# Patient Record
Sex: MALE | Race: BLACK OR AFRICAN AMERICAN | NOT HISPANIC OR LATINO | Employment: OTHER | RURAL
[De-identification: names, ages, dates, MRNs, and addresses within clinical notes are randomized per-mention and may not be internally consistent; named-entity substitution may affect disease eponyms.]

---

## 2017-11-01 ENCOUNTER — HISTORICAL (OUTPATIENT)
Dept: ADMINISTRATIVE | Facility: HOSPITAL | Age: 47
End: 2017-11-01

## 2017-11-02 LAB
LAB AP CLINICAL INFORMATION: NORMAL
LAB AP DIAGNOSIS - HISTORICAL: NORMAL
LAB AP GROSS PATHOLOGY - HISTORICAL: NORMAL
LAB AP SPECIMEN SUBMITTED - HISTORICAL: NORMAL

## 2018-07-19 ENCOUNTER — HISTORICAL (OUTPATIENT)
Dept: ADMINISTRATIVE | Facility: HOSPITAL | Age: 48
End: 2018-07-19

## 2018-07-20 LAB
LAB AP DIAGNOSIS - HISTORICAL: NORMAL
LAB AP GROSS PATHOLOGY - HISTORICAL: NORMAL
LAB AP SPECIMEN SUBMITTED - HISTORICAL: NORMAL

## 2021-06-24 ENCOUNTER — OFFICE VISIT (OUTPATIENT)
Dept: FAMILY MEDICINE | Facility: CLINIC | Age: 51
End: 2021-06-24
Payer: MEDICAID

## 2021-06-24 VITALS
SYSTOLIC BLOOD PRESSURE: 147 MMHG | HEIGHT: 69 IN | TEMPERATURE: 97 F | OXYGEN SATURATION: 98 % | HEART RATE: 80 BPM | RESPIRATION RATE: 20 BRPM | DIASTOLIC BLOOD PRESSURE: 100 MMHG | WEIGHT: 215 LBS | BODY MASS INDEX: 31.84 KG/M2

## 2021-06-24 DIAGNOSIS — H66.91 RIGHT OTITIS MEDIA, UNSPECIFIED OTITIS MEDIA TYPE: ICD-10-CM

## 2021-06-24 DIAGNOSIS — J01.00 ACUTE NON-RECURRENT MAXILLARY SINUSITIS: Primary | ICD-10-CM

## 2021-06-24 PROCEDURE — 96372 THER/PROPH/DIAG INJ SC/IM: CPT | Mod: ,,, | Performed by: NURSE PRACTITIONER

## 2021-06-24 PROCEDURE — 99213 PR OFFICE/OUTPT VISIT, EST, LEVL III, 20-29 MIN: ICD-10-PCS | Mod: 25,,, | Performed by: NURSE PRACTITIONER

## 2021-06-24 PROCEDURE — 96372 PR INJECTION,THERAP/PROPH/DIAG2ST, IM OR SUBCUT: ICD-10-PCS | Mod: ,,, | Performed by: NURSE PRACTITIONER

## 2021-06-24 PROCEDURE — 99213 OFFICE O/P EST LOW 20 MIN: CPT | Mod: 25,,, | Performed by: NURSE PRACTITIONER

## 2021-06-24 RX ORDER — FLUTICASONE PROPIONATE 50 MCG
SPRAY, SUSPENSION (ML) NASAL
COMMUNITY
Start: 2021-05-05

## 2021-06-24 RX ORDER — CEFDINIR 300 MG/1
300 CAPSULE ORAL 2 TIMES DAILY
Qty: 20 CAPSULE | Refills: 0 | Status: SHIPPED | OUTPATIENT
Start: 2021-06-24 | End: 2021-07-04

## 2021-06-24 RX ORDER — CEFTRIAXONE 1 G/1
1 INJECTION, POWDER, FOR SOLUTION INTRAMUSCULAR; INTRAVENOUS
Status: COMPLETED | OUTPATIENT
Start: 2021-06-24 | End: 2021-06-24

## 2021-06-24 RX ORDER — MORPHINE SULFATE 15 MG/1
TABLET, FILM COATED, EXTENDED RELEASE ORAL
COMMUNITY
Start: 2021-06-21

## 2021-06-24 RX ORDER — PANTOPRAZOLE SODIUM 20 MG/1
20 TABLET, DELAYED RELEASE ORAL DAILY
COMMUNITY
Start: 2021-04-28 | End: 2023-01-13

## 2021-06-24 RX ORDER — DEXAMETHASONE SODIUM PHOSPHATE 4 MG/ML
6 INJECTION, SOLUTION INTRA-ARTICULAR; INTRALESIONAL; INTRAMUSCULAR; INTRAVENOUS; SOFT TISSUE ONCE
Status: COMPLETED | OUTPATIENT
Start: 2021-06-24 | End: 2021-06-24

## 2021-06-24 RX ORDER — LOVASTATIN 20 MG/1
TABLET ORAL
COMMUNITY
Start: 2021-05-19

## 2021-06-24 RX ORDER — PALIPERIDONE 9 MG/1
9 TABLET, EXTENDED RELEASE ORAL DAILY
COMMUNITY
Start: 2021-04-01

## 2021-06-24 RX ORDER — LISINOPRIL AND HYDROCHLOROTHIAZIDE 20; 25 MG/1; MG/1
1 TABLET ORAL DAILY
COMMUNITY
Start: 2021-06-11

## 2021-06-24 RX ORDER — CETIRIZINE HYDROCHLORIDE 10 MG/1
TABLET ORAL
COMMUNITY
Start: 2021-04-28

## 2021-06-24 RX ORDER — DULOXETIN HYDROCHLORIDE 60 MG/1
CAPSULE, DELAYED RELEASE ORAL
COMMUNITY
Start: 2021-04-01

## 2021-06-24 RX ORDER — CETIRIZINE HYDROCHLORIDE, PSEUDOEPHEDRINE HYDROCHLORIDE 5; 120 MG/1; MG/1
1 TABLET, FILM COATED, EXTENDED RELEASE ORAL 2 TIMES DAILY
Qty: 20 TABLET | Refills: 0 | Status: SHIPPED | OUTPATIENT
Start: 2021-06-24 | End: 2021-07-04

## 2021-06-24 RX ADMIN — CEFTRIAXONE 1 G: 1 INJECTION, POWDER, FOR SOLUTION INTRAMUSCULAR; INTRAVENOUS at 10:06

## 2021-06-24 RX ADMIN — DEXAMETHASONE SODIUM PHOSPHATE 6 MG: 4 INJECTION, SOLUTION INTRA-ARTICULAR; INTRALESIONAL; INTRAMUSCULAR; INTRAVENOUS; SOFT TISSUE at 10:06

## 2021-09-27 PROBLEM — J01.00 ACUTE NON-RECURRENT MAXILLARY SINUSITIS: Status: RESOLVED | Noted: 2021-06-24 | Resolved: 2021-09-27

## 2021-12-23 ENCOUNTER — OFFICE VISIT (OUTPATIENT)
Dept: FAMILY MEDICINE | Facility: CLINIC | Age: 51
End: 2021-12-23
Payer: MEDICAID

## 2021-12-23 VITALS
SYSTOLIC BLOOD PRESSURE: 136 MMHG | WEIGHT: 216 LBS | RESPIRATION RATE: 18 BRPM | BODY MASS INDEX: 31.99 KG/M2 | HEART RATE: 94 BPM | OXYGEN SATURATION: 99 % | TEMPERATURE: 98 F | HEIGHT: 69 IN | DIASTOLIC BLOOD PRESSURE: 72 MMHG

## 2021-12-23 DIAGNOSIS — L42 PITYRIASIS ROSEA: Primary | ICD-10-CM

## 2021-12-23 PROCEDURE — 99213 OFFICE O/P EST LOW 20 MIN: CPT | Mod: 25,,, | Performed by: NURSE PRACTITIONER

## 2021-12-23 PROCEDURE — 3008F PR BODY MASS INDEX (BMI) DOCUMENTED: ICD-10-PCS | Mod: CPTII,,, | Performed by: NURSE PRACTITIONER

## 2021-12-23 PROCEDURE — 3075F SYST BP GE 130 - 139MM HG: CPT | Mod: CPTII,,, | Performed by: NURSE PRACTITIONER

## 2021-12-23 PROCEDURE — 3075F PR MOST RECENT SYSTOLIC BLOOD PRESS GE 130-139MM HG: ICD-10-PCS | Mod: CPTII,,, | Performed by: NURSE PRACTITIONER

## 2021-12-23 PROCEDURE — 99213 PR OFFICE/OUTPT VISIT, EST, LEVL III, 20-29 MIN: ICD-10-PCS | Mod: 25,,, | Performed by: NURSE PRACTITIONER

## 2021-12-23 PROCEDURE — 3078F DIAST BP <80 MM HG: CPT | Mod: CPTII,,, | Performed by: NURSE PRACTITIONER

## 2021-12-23 PROCEDURE — 1160F RVW MEDS BY RX/DR IN RCRD: CPT | Mod: CPTII,,, | Performed by: NURSE PRACTITIONER

## 2021-12-23 PROCEDURE — 4010F PR ACE/ARB THEARPY RXD/TAKEN: ICD-10-PCS | Mod: CPTII,,, | Performed by: NURSE PRACTITIONER

## 2021-12-23 PROCEDURE — 1159F MED LIST DOCD IN RCRD: CPT | Mod: CPTII,,, | Performed by: NURSE PRACTITIONER

## 2021-12-23 PROCEDURE — 4010F ACE/ARB THERAPY RXD/TAKEN: CPT | Mod: CPTII,,, | Performed by: NURSE PRACTITIONER

## 2021-12-23 PROCEDURE — 3008F BODY MASS INDEX DOCD: CPT | Mod: CPTII,,, | Performed by: NURSE PRACTITIONER

## 2021-12-23 PROCEDURE — 96372 THER/PROPH/DIAG INJ SC/IM: CPT | Mod: ,,, | Performed by: NURSE PRACTITIONER

## 2021-12-23 PROCEDURE — 1160F PR REVIEW ALL MEDS BY PRESCRIBER/CLIN PHARMACIST DOCUMENTED: ICD-10-PCS | Mod: CPTII,,, | Performed by: NURSE PRACTITIONER

## 2021-12-23 PROCEDURE — 96372 PR INJECTION,THERAP/PROPH/DIAG2ST, IM OR SUBCUT: ICD-10-PCS | Mod: ,,, | Performed by: NURSE PRACTITIONER

## 2021-12-23 PROCEDURE — 3078F PR MOST RECENT DIASTOLIC BLOOD PRESSURE < 80 MM HG: ICD-10-PCS | Mod: CPTII,,, | Performed by: NURSE PRACTITIONER

## 2021-12-23 PROCEDURE — 1159F PR MEDICATION LIST DOCUMENTED IN MEDICAL RECORD: ICD-10-PCS | Mod: CPTII,,, | Performed by: NURSE PRACTITIONER

## 2021-12-23 RX ORDER — PREDNISONE 20 MG/1
TABLET ORAL
Qty: 9 TABLET | Refills: 0 | Status: SHIPPED | OUTPATIENT
Start: 2021-12-23 | End: 2021-12-29

## 2021-12-23 RX ORDER — DEXAMETHASONE SODIUM PHOSPHATE 4 MG/ML
4 INJECTION, SOLUTION INTRA-ARTICULAR; INTRALESIONAL; INTRAMUSCULAR; INTRAVENOUS; SOFT TISSUE
Status: DISCONTINUED | OUTPATIENT
Start: 2021-12-23 | End: 2021-12-23

## 2021-12-23 RX ORDER — DEXAMETHASONE SODIUM PHOSPHATE 4 MG/ML
6 INJECTION, SOLUTION INTRA-ARTICULAR; INTRALESIONAL; INTRAMUSCULAR; INTRAVENOUS; SOFT TISSUE
Status: COMPLETED | OUTPATIENT
Start: 2021-12-23 | End: 2021-12-23

## 2021-12-23 RX ORDER — TRIAMCINOLONE ACETONIDE 1 MG/G
CREAM TOPICAL 2 TIMES DAILY
Qty: 453.6 G | Refills: 0 | Status: SHIPPED | OUTPATIENT
Start: 2021-12-23

## 2021-12-23 RX ADMIN — DEXAMETHASONE SODIUM PHOSPHATE 6 MG: 4 INJECTION, SOLUTION INTRA-ARTICULAR; INTRALESIONAL; INTRAMUSCULAR; INTRAVENOUS; SOFT TISSUE at 10:12

## 2022-01-05 ENCOUNTER — OFFICE VISIT (OUTPATIENT)
Dept: FAMILY MEDICINE | Facility: CLINIC | Age: 52
End: 2022-01-05
Payer: MEDICAID

## 2022-01-05 VITALS
OXYGEN SATURATION: 98 % | TEMPERATURE: 97 F | HEART RATE: 94 BPM | DIASTOLIC BLOOD PRESSURE: 82 MMHG | HEIGHT: 69 IN | WEIGHT: 218 LBS | RESPIRATION RATE: 17 BRPM | SYSTOLIC BLOOD PRESSURE: 125 MMHG | BODY MASS INDEX: 32.29 KG/M2

## 2022-01-05 DIAGNOSIS — U07.1 COVID-19: Primary | ICD-10-CM

## 2022-01-05 DIAGNOSIS — Z11.52 ENCOUNTER FOR SCREENING LABORATORY TESTING FOR COVID-19 VIRUS: ICD-10-CM

## 2022-01-05 LAB
CTP QC/QA: YES
FLUAV AG NPH QL: NEGATIVE
FLUBV AG NPH QL: NEGATIVE
SARS-COV-2 AG RESP QL IA.RAPID: POSITIVE

## 2022-01-05 PROCEDURE — 1159F MED LIST DOCD IN RCRD: CPT | Mod: CPTII,,, | Performed by: FAMILY MEDICINE

## 2022-01-05 PROCEDURE — 1159F PR MEDICATION LIST DOCUMENTED IN MEDICAL RECORD: ICD-10-PCS | Mod: CPTII,,, | Performed by: FAMILY MEDICINE

## 2022-01-05 PROCEDURE — 3008F PR BODY MASS INDEX (BMI) DOCUMENTED: ICD-10-PCS | Mod: CPTII,,, | Performed by: FAMILY MEDICINE

## 2022-01-05 PROCEDURE — 99214 PR OFFICE/OUTPT VISIT, EST, LEVL IV, 30-39 MIN: ICD-10-PCS | Mod: ,,, | Performed by: FAMILY MEDICINE

## 2022-01-05 PROCEDURE — 3074F PR MOST RECENT SYSTOLIC BLOOD PRESSURE < 130 MM HG: ICD-10-PCS | Mod: CPTII,,, | Performed by: FAMILY MEDICINE

## 2022-01-05 PROCEDURE — 3074F SYST BP LT 130 MM HG: CPT | Mod: CPTII,,, | Performed by: FAMILY MEDICINE

## 2022-01-05 PROCEDURE — 3079F DIAST BP 80-89 MM HG: CPT | Mod: CPTII,,, | Performed by: FAMILY MEDICINE

## 2022-01-05 PROCEDURE — 1160F RVW MEDS BY RX/DR IN RCRD: CPT | Mod: CPTII,,, | Performed by: FAMILY MEDICINE

## 2022-01-05 PROCEDURE — 1160F PR REVIEW ALL MEDS BY PRESCRIBER/CLIN PHARMACIST DOCUMENTED: ICD-10-PCS | Mod: CPTII,,, | Performed by: FAMILY MEDICINE

## 2022-01-05 PROCEDURE — 3079F PR MOST RECENT DIASTOLIC BLOOD PRESSURE 80-89 MM HG: ICD-10-PCS | Mod: CPTII,,, | Performed by: FAMILY MEDICINE

## 2022-01-05 PROCEDURE — 3008F BODY MASS INDEX DOCD: CPT | Mod: CPTII,,, | Performed by: FAMILY MEDICINE

## 2022-01-05 PROCEDURE — 87428 SARSCOV & INF VIR A&B AG IA: CPT | Mod: RHCUB | Performed by: FAMILY MEDICINE

## 2022-01-05 PROCEDURE — 99214 OFFICE O/P EST MOD 30 MIN: CPT | Mod: ,,, | Performed by: FAMILY MEDICINE

## 2022-01-05 RX ORDER — PREDNISONE 20 MG/1
20 TABLET ORAL DAILY
Qty: 5 TABLET | Refills: 0 | Status: SHIPPED | OUTPATIENT
Start: 2022-01-05 | End: 2022-01-10

## 2022-01-05 RX ORDER — CETIRIZINE HYDROCHLORIDE 10 MG/1
10 TABLET ORAL DAILY
Qty: 10 TABLET | Refills: 0 | Status: SHIPPED | OUTPATIENT
Start: 2022-01-05 | End: 2022-01-15

## 2022-01-05 RX ORDER — AZITHROMYCIN 250 MG/1
TABLET, FILM COATED ORAL
Qty: 6 TABLET | Refills: 0 | Status: ON HOLD | OUTPATIENT
Start: 2022-01-05 | End: 2022-03-22

## 2022-01-05 NOTE — PROGRESS NOTES
Subjective:       Patient ID: Javad Garcia is a 51 y.o. male.    Chief Complaint: COVID-19 Concerns (Sore throat/chest congestion x 3days)    HPI  Review of Systems   Constitutional: Negative for activity change, appetite change, chills, diaphoresis, fatigue, fever and unexpected weight change.   HENT: Positive for nasal congestion, postnasal drip, rhinorrhea and sinus pressure/congestion. Negative for dental problem, drooling, ear discharge, ear pain, facial swelling, hearing loss, mouth sores, nosebleeds, sneezing, sore throat, tinnitus, trouble swallowing, voice change and goiter.    Eyes: Negative for photophobia, pain, discharge, redness, itching and visual disturbance.   Respiratory: Positive for cough. Negative for apnea, choking, chest tightness, shortness of breath, wheezing and stridor.    Cardiovascular: Negative for chest pain, palpitations, leg swelling and claudication.   Gastrointestinal: Negative for abdominal distention, abdominal pain, anal bleeding, blood in stool, change in bowel habit, constipation, diarrhea, nausea, vomiting, reflux, fecal incontinence and change in bowel habit.   Endocrine: Negative for cold intolerance, heat intolerance, polydipsia, polyphagia and polyuria.   Genitourinary: Negative for bladder incontinence, decreased urine volume, difficulty urinating, discharge, dysuria, enuresis, erectile dysfunction, flank pain, frequency, genital sores, hematuria, penile pain, testicular pain and urgency.   Musculoskeletal: Negative for arthralgias, back pain, gait problem, joint swelling, leg pain, myalgias, neck pain, neck stiffness and joint deformity.   Integumentary:  Negative for pallor, rash, wound and mole/lesion.   Allergic/Immunologic: Negative for environmental allergies, food allergies and frequent infections.   Neurological: Negative for dizziness, vertigo, tremors, seizures, syncope, facial asymmetry, speech difficulty, weakness, light-headedness, numbness, headaches,  disturbances in coordination, memory loss and coordination difficulties.   Hematological: Negative for adenopathy. Does not bruise/bleed easily.   Psychiatric/Behavioral: Negative for agitation, behavioral problems, confusion, decreased concentration, dysphoric mood, hallucinations, self-injury, sleep disturbance and suicidal ideas. The patient is not nervous/anxious and is not hyperactive.          Objective:      Physical Exam  Vitals reviewed.   Constitutional:       Appearance: Normal appearance. He is normal weight.   HENT:      Head: Normocephalic and atraumatic.      Right Ear: Tympanic membrane and ear canal normal.      Left Ear: Tympanic membrane, ear canal and external ear normal.      Nose: Congestion and rhinorrhea present.      Mouth/Throat:      Mouth: Mucous membranes are moist.      Pharynx: Oropharynx is clear. Posterior oropharyngeal erythema present.   Eyes:      Extraocular Movements: Extraocular movements intact.      Conjunctiva/sclera: Conjunctivae normal.      Pupils: Pupils are equal, round, and reactive to light.   Cardiovascular:      Rate and Rhythm: Normal rate and regular rhythm.      Pulses: Normal pulses.      Heart sounds: Normal heart sounds.   Pulmonary:      Effort: Pulmonary effort is normal.      Breath sounds: Normal breath sounds.   Abdominal:      General: Abdomen is flat. Bowel sounds are normal.      Palpations: Abdomen is soft.   Musculoskeletal:         General: Normal range of motion.      Cervical back: Normal range of motion and neck supple.   Skin:     General: Skin is warm and dry.   Neurological:      General: No focal deficit present.      Mental Status: He is alert and oriented to person, place, and time. Mental status is at baseline.   Psychiatric:         Mood and Affect: Mood normal.         Behavior: Behavior normal.         Thought Content: Thought content normal.         Judgment: Judgment normal.         Assessment:       1. COVID-19    2. Encounter for  screening laboratory testing for COVID-19 virus        Plan:     COVID-19    Encounter for screening laboratory testing for COVID-19 virus  -     POCT SARS-COV2 (COVID) with Flu Antigen    Other orders  -     azithromycin (Z-DONG) 250 MG tablet; Take 2 tablets by mouth on day 1; Take 1 tablet by mouth on days 2-5  Dispense: 6 tablet; Refill: 0  -     predniSONE (DELTASONE) 20 MG tablet; Take 1 tablet (20 mg total) by mouth once daily. for 5 days  Dispense: 5 tablet; Refill: 0  -     cetirizine (ZYRTEC) 10 MG tablet; Take 1 tablet (10 mg total) by mouth once daily. for 10 days  Dispense: 10 tablet; Refill: 0         Covid positive, treat for this, quarantine for 5 days.

## 2022-03-09 ENCOUNTER — HOSPITAL ENCOUNTER (OUTPATIENT)
Dept: RADIOLOGY | Facility: HOSPITAL | Age: 52
Discharge: HOME | End: 2022-03-09
Attending: ORTHOPAEDIC SURGERY
Payer: MEDICAID

## 2022-03-09 DIAGNOSIS — M25.512 LEFT SHOULDER PAIN, UNSPECIFIED CHRONICITY: ICD-10-CM

## 2022-03-09 PROBLEM — M75.122 NONTRAUMATIC COMPLETE TEAR OF LEFT ROTATOR CUFF: Status: ACTIVE | Noted: 2022-03-09

## 2022-03-09 PROCEDURE — 73030 X-RAY EXAM OF SHOULDER: CPT | Mod: TC,LT

## 2022-03-22 ENCOUNTER — ANESTHESIA (OUTPATIENT)
Dept: SURGERY | Facility: HOSPITAL | Age: 52
End: 2022-03-22
Payer: MEDICAID

## 2022-03-22 ENCOUNTER — HOSPITAL ENCOUNTER (OUTPATIENT)
Facility: HOSPITAL | Age: 52
Discharge: HOME | End: 2022-03-22
Attending: ORTHOPAEDIC SURGERY | Admitting: ORTHOPAEDIC SURGERY
Payer: MEDICAID

## 2022-03-22 ENCOUNTER — ANESTHESIA EVENT (OUTPATIENT)
Dept: SURGERY | Facility: HOSPITAL | Age: 52
End: 2022-03-22
Payer: MEDICAID

## 2022-03-22 VITALS
RESPIRATION RATE: 16 BRPM | WEIGHT: 214 LBS | TEMPERATURE: 98 F | HEART RATE: 95 BPM | HEIGHT: 69 IN | OXYGEN SATURATION: 94 % | DIASTOLIC BLOOD PRESSURE: 66 MMHG | SYSTOLIC BLOOD PRESSURE: 125 MMHG | BODY MASS INDEX: 31.7 KG/M2

## 2022-03-22 DIAGNOSIS — I10 HTN (HYPERTENSION): ICD-10-CM

## 2022-03-22 DIAGNOSIS — M75.122 NONTRAUMATIC COMPLETE TEAR OF LEFT ROTATOR CUFF: Primary | ICD-10-CM

## 2022-03-22 PROCEDURE — 64415 PERIPHERAL BLOCK: ICD-10-PCS | Mod: XU,LT,, | Performed by: ANESTHESIOLOGY

## 2022-03-22 PROCEDURE — 27000716 HC OXISENSOR PROBE, ANY SIZE: Performed by: ANESTHESIOLOGY

## 2022-03-22 PROCEDURE — D9220A PRA ANESTHESIA: ICD-10-PCS | Mod: CRNA,,, | Performed by: NURSE ANESTHETIST, CERTIFIED REGISTERED

## 2022-03-22 PROCEDURE — 01630 ANES OPN/ARTHR PX SHO JT NOS: CPT | Performed by: ORTHOPAEDIC SURGERY

## 2022-03-22 PROCEDURE — 27000260 *HC AIRWAY ORAL: Performed by: ANESTHESIOLOGY

## 2022-03-22 PROCEDURE — 37000009 HC ANESTHESIA EA ADD 15 MINS: Performed by: ORTHOPAEDIC SURGERY

## 2022-03-22 PROCEDURE — 71000033 HC RECOVERY, INTIAL HOUR: Performed by: ORTHOPAEDIC SURGERY

## 2022-03-22 PROCEDURE — 63600175 PHARM REV CODE 636 W HCPCS: Performed by: ANESTHESIOLOGY

## 2022-03-22 PROCEDURE — 27000165 HC TUBE, ETT CUFFED: Performed by: ANESTHESIOLOGY

## 2022-03-22 PROCEDURE — D9220A PRA ANESTHESIA: Mod: ANES,,, | Performed by: ANESTHESIOLOGY

## 2022-03-22 PROCEDURE — 93010 ELECTROCARDIOGRAM REPORT: CPT | Mod: ,,, | Performed by: HOSPITALIST

## 2022-03-22 PROCEDURE — 63600175 PHARM REV CODE 636 W HCPCS: Performed by: NURSE ANESTHETIST, CERTIFIED REGISTERED

## 2022-03-22 PROCEDURE — C1713 ANCHOR/SCREW BN/BN,TIS/BN: HCPCS | Performed by: ORTHOPAEDIC SURGERY

## 2022-03-22 PROCEDURE — 25000003 PHARM REV CODE 250: Performed by: NURSE ANESTHETIST, CERTIFIED REGISTERED

## 2022-03-22 PROCEDURE — 27000510 HC BLANKET BAIR HUGGER ANY SIZE: Performed by: ANESTHESIOLOGY

## 2022-03-22 PROCEDURE — 27000689 HC BLADE LARYNGOSCOPE ANY SIZE: Performed by: ANESTHESIOLOGY

## 2022-03-22 PROCEDURE — 36000710: Performed by: ORTHOPAEDIC SURGERY

## 2022-03-22 PROCEDURE — 36000711: Performed by: ORTHOPAEDIC SURGERY

## 2022-03-22 PROCEDURE — 64415 NJX AA&/STRD BRCH PLXS IMG: CPT | Mod: XU,LT,, | Performed by: ANESTHESIOLOGY

## 2022-03-22 PROCEDURE — 27000655: Performed by: ANESTHESIOLOGY

## 2022-03-22 PROCEDURE — 93005 ELECTROCARDIOGRAM TRACING: CPT | Mod: 59

## 2022-03-22 PROCEDURE — 37000008 HC ANESTHESIA 1ST 15 MINUTES: Performed by: ORTHOPAEDIC SURGERY

## 2022-03-22 PROCEDURE — 71000015 HC POSTOP RECOV 1ST HR: Performed by: ORTHOPAEDIC SURGERY

## 2022-03-22 PROCEDURE — 27201423 OPTIME MED/SURG SUP & DEVICES STERILE SUPPLY: Performed by: ORTHOPAEDIC SURGERY

## 2022-03-22 PROCEDURE — 93010 EKG 12-LEAD: ICD-10-PCS | Mod: ,,, | Performed by: HOSPITALIST

## 2022-03-22 PROCEDURE — D9220A PRA ANESTHESIA: ICD-10-PCS | Mod: ANES,,, | Performed by: ANESTHESIOLOGY

## 2022-03-22 PROCEDURE — 25000003 PHARM REV CODE 250: Performed by: ORTHOPAEDIC SURGERY

## 2022-03-22 PROCEDURE — D9220A PRA ANESTHESIA: Mod: CRNA,,, | Performed by: NURSE ANESTHETIST, CERTIFIED REGISTERED

## 2022-03-22 DEVICE — IMP SUTURE ANCHOR SWVLKC CLD 4.75X19.1MM: Type: IMPLANTABLE DEVICE | Site: SHOULDER | Status: FUNCTIONAL

## 2022-03-22 RX ORDER — PROMETHAZINE HYDROCHLORIDE 25 MG/1
25 TABLET ORAL EVERY 6 HOURS PRN
Status: DISCONTINUED | OUTPATIENT
Start: 2022-03-22 | End: 2022-03-22 | Stop reason: HOSPADM

## 2022-03-22 RX ORDER — ONDANSETRON 2 MG/ML
INJECTION INTRAMUSCULAR; INTRAVENOUS
Status: DISCONTINUED | OUTPATIENT
Start: 2022-03-22 | End: 2022-03-22

## 2022-03-22 RX ORDER — SODIUM CHLORIDE, SODIUM LACTATE, POTASSIUM CHLORIDE, CALCIUM CHLORIDE 600; 310; 30; 20 MG/100ML; MG/100ML; MG/100ML; MG/100ML
125 INJECTION, SOLUTION INTRAVENOUS CONTINUOUS
Status: DISCONTINUED | OUTPATIENT
Start: 2022-03-22 | End: 2022-03-22 | Stop reason: HOSPADM

## 2022-03-22 RX ORDER — MORPHINE SULFATE 10 MG/ML
4 INJECTION INTRAMUSCULAR; INTRAVENOUS; SUBCUTANEOUS EVERY 5 MIN PRN
Status: DISCONTINUED | OUTPATIENT
Start: 2022-03-22 | End: 2022-03-22 | Stop reason: HOSPADM

## 2022-03-22 RX ORDER — HYDROCODONE BITARTRATE AND ACETAMINOPHEN 10; 325 MG/1; MG/1
1 TABLET ORAL EVERY 4 HOURS PRN
Status: DISCONTINUED | OUTPATIENT
Start: 2022-03-22 | End: 2022-03-22 | Stop reason: HOSPADM

## 2022-03-22 RX ORDER — ACETAMINOPHEN 500 MG
1000 TABLET ORAL EVERY 6 HOURS PRN
Status: DISCONTINUED | OUTPATIENT
Start: 2022-03-22 | End: 2022-03-22 | Stop reason: HOSPADM

## 2022-03-22 RX ORDER — HYDROCODONE BITARTRATE AND ACETAMINOPHEN 5; 325 MG/1; MG/1
1 TABLET ORAL EVERY 4 HOURS PRN
Status: DISCONTINUED | OUTPATIENT
Start: 2022-03-22 | End: 2022-03-22 | Stop reason: HOSPADM

## 2022-03-22 RX ORDER — HYDROMORPHONE HYDROCHLORIDE 2 MG/ML
0.5 INJECTION, SOLUTION INTRAMUSCULAR; INTRAVENOUS; SUBCUTANEOUS EVERY 5 MIN PRN
Status: DISCONTINUED | OUTPATIENT
Start: 2022-03-22 | End: 2022-03-22 | Stop reason: HOSPADM

## 2022-03-22 RX ORDER — DIPHENHYDRAMINE HYDROCHLORIDE 50 MG/ML
25 INJECTION INTRAMUSCULAR; INTRAVENOUS EVERY 6 HOURS PRN
Status: DISCONTINUED | OUTPATIENT
Start: 2022-03-22 | End: 2022-03-22 | Stop reason: HOSPADM

## 2022-03-22 RX ORDER — ONDANSETRON 2 MG/ML
4 INJECTION INTRAMUSCULAR; INTRAVENOUS DAILY PRN
Status: DISCONTINUED | OUTPATIENT
Start: 2022-03-22 | End: 2022-03-22 | Stop reason: HOSPADM

## 2022-03-22 RX ORDER — FENTANYL CITRATE 50 UG/ML
INJECTION, SOLUTION INTRAMUSCULAR; INTRAVENOUS
Status: DISCONTINUED | OUTPATIENT
Start: 2022-03-22 | End: 2022-03-22

## 2022-03-22 RX ORDER — SODIUM CHLORIDE, SODIUM LACTATE, POTASSIUM CHLORIDE, CALCIUM CHLORIDE 600; 310; 30; 20 MG/100ML; MG/100ML; MG/100ML; MG/100ML
INJECTION, SOLUTION INTRAVENOUS CONTINUOUS
Status: DISCONTINUED | OUTPATIENT
Start: 2022-03-22 | End: 2022-03-22 | Stop reason: HOSPADM

## 2022-03-22 RX ORDER — PROPOFOL 10 MG/ML
VIAL (ML) INTRAVENOUS
Status: DISCONTINUED | OUTPATIENT
Start: 2022-03-22 | End: 2022-03-22

## 2022-03-22 RX ORDER — CEFAZOLIN SODIUM 2 G/50ML
2 SOLUTION INTRAVENOUS
Status: DISCONTINUED | OUTPATIENT
Start: 2022-03-22 | End: 2022-03-22 | Stop reason: HOSPADM

## 2022-03-22 RX ORDER — MEPERIDINE HYDROCHLORIDE 25 MG/ML
25 INJECTION INTRAMUSCULAR; INTRAVENOUS; SUBCUTANEOUS EVERY 10 MIN PRN
Status: DISCONTINUED | OUTPATIENT
Start: 2022-03-22 | End: 2022-03-22 | Stop reason: HOSPADM

## 2022-03-22 RX ORDER — MIDAZOLAM HYDROCHLORIDE 1 MG/ML
INJECTION INTRAMUSCULAR; INTRAVENOUS
Status: DISCONTINUED | OUTPATIENT
Start: 2022-03-22 | End: 2022-03-22

## 2022-03-22 RX ORDER — ROCURONIUM BROMIDE 10 MG/ML
INJECTION, SOLUTION INTRAVENOUS
Status: DISCONTINUED | OUTPATIENT
Start: 2022-03-22 | End: 2022-03-22

## 2022-03-22 RX ORDER — ONDANSETRON 4 MG/1
8 TABLET, ORALLY DISINTEGRATING ORAL EVERY 8 HOURS PRN
Status: DISCONTINUED | OUTPATIENT
Start: 2022-03-22 | End: 2022-03-22 | Stop reason: HOSPADM

## 2022-03-22 RX ORDER — PHENYLEPHRINE HYDROCHLORIDE 10 MG/ML
INJECTION INTRAVENOUS
Status: DISCONTINUED | OUTPATIENT
Start: 2022-03-22 | End: 2022-03-22

## 2022-03-22 RX ORDER — LIDOCAINE HYDROCHLORIDE 10 MG/ML
1 INJECTION, SOLUTION EPIDURAL; INFILTRATION; INTRACAUDAL; PERINEURAL ONCE
Status: DISCONTINUED | OUTPATIENT
Start: 2022-03-22 | End: 2022-03-22 | Stop reason: HOSPADM

## 2022-03-22 RX ORDER — LIDOCAINE HYDROCHLORIDE 20 MG/ML
INJECTION, SOLUTION EPIDURAL; INFILTRATION; INTRACAUDAL; PERINEURAL
Status: DISCONTINUED | OUTPATIENT
Start: 2022-03-22 | End: 2022-03-22

## 2022-03-22 RX ORDER — KETOROLAC TROMETHAMINE 30 MG/ML
INJECTION, SOLUTION INTRAMUSCULAR; INTRAVENOUS
Status: DISCONTINUED | OUTPATIENT
Start: 2022-03-22 | End: 2022-03-22

## 2022-03-22 RX ORDER — SODIUM CHLORIDE 9 MG/ML
INJECTION, SOLUTION INTRAVENOUS CONTINUOUS
Status: DISCONTINUED | OUTPATIENT
Start: 2022-03-22 | End: 2022-03-22 | Stop reason: HOSPADM

## 2022-03-22 RX ORDER — ROPIVACAINE HYDROCHLORIDE 7.5 MG/ML
INJECTION, SOLUTION EPIDURAL; PERINEURAL
Status: COMPLETED | OUTPATIENT
Start: 2022-03-22 | End: 2022-03-22

## 2022-03-22 RX ORDER — DEXAMETHASONE SODIUM PHOSPHATE 4 MG/ML
INJECTION, SOLUTION INTRA-ARTICULAR; INTRALESIONAL; INTRAMUSCULAR; INTRAVENOUS; SOFT TISSUE
Status: DISCONTINUED | OUTPATIENT
Start: 2022-03-22 | End: 2022-03-22

## 2022-03-22 RX ADMIN — PHENYLEPHRINE HYDROCHLORIDE 100 MCG: 10 INJECTION INTRAVENOUS at 10:03

## 2022-03-22 RX ADMIN — DEXAMETHASONE SODIUM PHOSPHATE 8 MG: 4 INJECTION, SOLUTION INTRA-ARTICULAR; INTRALESIONAL; INTRAMUSCULAR; INTRAVENOUS; SOFT TISSUE at 10:03

## 2022-03-22 RX ADMIN — FENTANYL CITRATE 50 MCG: 50 INJECTION INTRAMUSCULAR; INTRAVENOUS at 10:03

## 2022-03-22 RX ADMIN — MIDAZOLAM 2 MG: 1 INJECTION INTRAMUSCULAR; INTRAVENOUS at 10:03

## 2022-03-22 RX ADMIN — ONDANSETRON 4 MG: 2 INJECTION INTRAMUSCULAR; INTRAVENOUS at 11:03

## 2022-03-22 RX ADMIN — PHENYLEPHRINE HYDROCHLORIDE 200 MCG: 10 INJECTION INTRAVENOUS at 10:03

## 2022-03-22 RX ADMIN — ROPIVACAINE HYDROCHLORIDE 30 ML: 7.5 INJECTION, SOLUTION EPIDURAL; PERINEURAL at 10:03

## 2022-03-22 RX ADMIN — SODIUM CHLORIDE: 9 INJECTION, SOLUTION INTRAVENOUS at 10:03

## 2022-03-22 RX ADMIN — PROPOFOL 150 MG: 10 INJECTION, EMULSION INTRAVENOUS at 10:03

## 2022-03-22 RX ADMIN — LIDOCAINE HYDROCHLORIDE 80 MG: 20 INJECTION, SOLUTION INTRAVENOUS at 10:03

## 2022-03-22 RX ADMIN — PHENYLEPHRINE HYDROCHLORIDE 200 MCG: 10 INJECTION INTRAVENOUS at 11:03

## 2022-03-22 RX ADMIN — CEFAZOLIN 2 G: 1 INJECTION, POWDER, FOR SOLUTION INTRAMUSCULAR; INTRAVENOUS; PARENTERAL at 10:03

## 2022-03-22 RX ADMIN — KETOROLAC TROMETHAMINE 60 MG: 30 INJECTION, SOLUTION INTRAMUSCULAR at 10:03

## 2022-03-22 RX ADMIN — ROCURONIUM BROMIDE 50 MG: 10 INJECTION, SOLUTION INTRAVENOUS at 10:03

## 2022-03-22 NOTE — ANESTHESIA PREPROCEDURE EVALUATION
03/22/2022  Javad Garcia is a 52 y.o., male.      Pre-op Assessment    I have reviewed the Patient Summary Reports.     I have reviewed the Nursing Notes. I have reviewed the NPO Status.   I have reviewed the Medications.     Review of Systems  Anesthesia Hx:  No problems with previous Anesthesia    Social:  Non-Smoker, No Alcohol Use    Hematology/Oncology:  Hematology Normal   Oncology Normal     EENT/Dental:EENT/Dental Normal   Cardiovascular:   Hypertension    Pulmonary:  Pulmonary Normal    Renal/:  Renal/ Normal     Hepatic/GI:  Hepatic/GI Normal    Musculoskeletal:  Musculoskeletal Normal    Neurological:  Neurology Normal RSD RT ARM     Endocrine:  Endocrine Normal  Obesity / BMI > 30  Dermatological:  Skin Normal    Psych:  Psychiatric Normal  CHRONIC NARCOTICS 2 RSD         Physical Exam    Airway:  Mallampati: III / III  Mouth Opening: Normal  TM Distance: Normal  Tongue: Normal  Neck ROM: Normal ROM  Pre-Existing Airway: Oral Endotracheal tube    Chest/Lungs:  Clear to auscultation    Heart:  Rate: Normal  Rhythm: Regular Rhythm        Anesthesia Plan  Type of Anesthesia, risks & benefits discussed:    Anesthesia Type: Gen ETT, Regional  Intra-op Monitoring Plan: Standard ASA Monitors  Post Op Pain Control Plan: multimodal analgesia  Induction:  IV  Informed Consent: Informed consent signed with the Patient and all parties understand the risks and agree with anesthesia plan.  All questions answered.   ASA Score: 3  Day of Surgery Review of History & Physical: H&P Update referred to the surgeon/provider.I have interviewed and examined the patient. I have reviewed the patient's H&P dated: There are no significant changes. H&P completed by Anesthesiologist.    Ready For Surgery From Anesthesia Perspective.     .

## 2022-03-22 NOTE — SUBJECTIVE & OBJECTIVE
History reviewed. No pertinent past medical history.    History reviewed. No pertinent surgical history.    Review of patient's allergies indicates:   Allergen Reactions    Oxycodone-acetaminophen        No current facility-administered medications for this encounter.     Current Outpatient Medications   Medication Sig    azithromycin (Z-DONG) 250 MG tablet Take 2 tablets by mouth on day 1; Take 1 tablet by mouth on days 2-5    cetirizine (ZYRTEC) 10 MG tablet TAKE 1 TABLET BY MOUTH ONCE DAILY AS NEEDED FOR SINUS DRAINAGE AND ALLERGIES    DULoxetine (CYMBALTA) 60 MG capsule TAKE 1 CAPSULE BY MOUTH TWICE DAILY AS DIRECTED    fluticasone propionate (FLONASE) 50 mcg/actuation nasal spray USE 1 TO 2 SPRAY(S) IN EACH NOSTRIL ONCE DAILY AS NEEDED FOR SINUS CONGESTION    lisinopriL-hydrochlorothiazide (PRINZIDE,ZESTORETIC) 20-25 mg Tab Take 1 tablet by mouth once daily.    lovastatin (MEVACOR) 20 MG tablet     morphine (MS CONTIN) 15 MG 12 hr tablet     NORCO  mg per tablet Take 1 tablet by mouth. Take 1 tablet by mouth 3 to 4 times daily as needed for pain    paliperidone (INVEGA) 9 MG TR24 Take 9 mg by mouth once daily.    pantoprazole (PROTONIX) 20 MG tablet Take 20 mg by mouth once daily.    triamcinolone acetonide 0.1% (KENALOG) 0.1 % cream Apply topically 2 (two) times daily.     Family History    None       Tobacco Use    Smoking status: Former Smoker    Smokeless tobacco: Current User     Types: Snuff   Substance and Sexual Activity    Alcohol use: Yes    Drug use: Never    Sexual activity: Not on file     Review of Systems   Constitutional: Negative.   Objective:     Vital Signs (Most Recent):    Vital Signs (24h Range):  BP: ()/()   Arterial Line BP: ()/()            There is no height or weight on file to calculate BMI.    No intake or output data in the 24 hours ending 03/21/22 5216    General    Vitals reviewed.  Constitutional: He is oriented to person, place, and time. He appears well-developed and  well-nourished.   HENT:   Head: Normocephalic and atraumatic.   Eyes: EOM are normal. Pupils are equal, round, and reactive to light.   Neck: Neck supple.   Cardiovascular:  Normal rate and regular rhythm.            Pulmonary/Chest: Effort normal and breath sounds normal.   Abdominal: Soft. Bowel sounds are normal.   Neurological: He is alert and oriented to person, place, and time. He has normal reflexes.   Psychiatric: He has a normal mood and affect. His behavior is normal.         Right Shoulder Exam   Right shoulder exam is normal.    Left Shoulder Exam     Tenderness   The patient is tender to palpation of the acromioclavicular joint, greater tuberosity and acromion.    Range of Motion   Active abduction:  abnormal   Passive abduction:  normal   Extension:  normal   Forward Flexion:  normal   Forward Elevation: normal  Adduction: normal    Tests & Signs   Impingement: positive    Other   Sensation: normal       Muscle Strength   Left Upper Extremity  Shoulder Abduction: 4/5   Shoulder Internal Rotation: 5/5   Shoulder External Rotation: 5/5   Supraspinatus: 4/5   Subscapularis: 5/5   Biceps: 5/5     Vascular Exam       Left Pulses      Radial:                    2+      Significant Labs: All pertinent labs within the past 24 hours have been reviewed.    Significant Imaging: I have reviewed and interpreted all pertinent imaging results/findings.

## 2022-03-22 NOTE — H&P
[facility] - Orthopedic Periop Services  Orthopedics  H&P    Patient Name: Javad Garcia  MRN: [id number]  Admission Date: (Not on file)  Primary Care Provider: Primary Doctor No    Patient information was obtained from patient and ER records.     Subjective:     Principal Problem:Nontraumatic complete tear of left rotator cuff    Chief Complaint: No chief complaint on file.       HPI: Chief complaint :left shoulder pain  History:Javad Garcia is a 52 y.o. male seen today for evaluation of left shoulder pain.  Symptoms began insidiously 6 months ago.  No history of trauma.  He is right-hand dominant.  Has a history of of right forearm fracture occurring at work as result of fallen wooden pallets.  Reportedly developed RSD.  He has had right carpal tunnel surgical release.  He has [facility] for his right upper extremity RSD and is disabled as a result.  There is no history of injury to the left shoulder.  Experiences pain with shoulder AP duction internal rotation.  There is a positive component of night pain.  He underwent MRI examination of left shoulder on 02/22/2022.  The MRI shows high-grade tearing of the supraspinatus tendon measuring 2.6 cm.  X-rays left shoulder two views:  Bones well mineralized.  Glenohumeral joint is located.  There is sclerosis involving the greater tuberosity.  No evidence of acute fracture, dislocation or pathologic bone.   Impression:  Impingement syndrome left shoulder with rotator cuff tear  Plan:  Left shoulder arthroscopic subacromial decompression, possible rotator cuff repair.  The potential benefits and risks of surgery outlined to include but not limited to bleeding, infection, damage to blood vessels and nerves, need for further surgery, other risks and complications including even death the patient wished to proceed.  We discussed outpatient general/regional block anesthesia        History reviewed. No pertinent past medical history.    History  reviewed. No pertinent surgical history.    Review of patient's allergies indicates:   Allergen Reactions    Oxycodone-acetaminophen        No current facility-administered medications for this encounter.     Current Outpatient Medications   Medication Sig    azithromycin (Z-DONG) 250 MG tablet Take 2 tablets by mouth on day 1; Take 1 tablet by mouth on days 2-5    cetirizine (ZYRTEC) 10 MG tablet TAKE 1 TABLET BY MOUTH ONCE DAILY AS NEEDED FOR SINUS DRAINAGE AND ALLERGIES    DULoxetine (CYMBALTA) 60 MG capsule TAKE 1 CAPSULE BY MOUTH TWICE DAILY AS DIRECTED    fluticasone propionate (FLONASE) 50 mcg/actuation nasal spray USE 1 TO 2 SPRAY(S) IN EACH NOSTRIL ONCE DAILY AS NEEDED FOR SINUS CONGESTION    lisinopriL-hydrochlorothiazide (PRINZIDE,ZESTORETIC) 20-25 mg Tab Take 1 tablet by mouth once daily.    lovastatin (MEVACOR) 20 MG tablet     morphine (MS CONTIN) 15 MG 12 hr tablet     NORCO  mg per tablet Take 1 tablet by mouth. Take 1 tablet by mouth 3 to 4 times daily as needed for pain    paliperidone (INVEGA) 9 MG TR24 Take 9 mg by mouth once daily.    pantoprazole (PROTONIX) 20 MG tablet Take 20 mg by mouth once daily.    triamcinolone acetonide 0.1% (KENALOG) 0.1 % cream Apply topically 2 (two) times daily.     Family History    None       Tobacco Use    Smoking status: Former Smoker    Smokeless tobacco: Current User     Types: Snuff   Substance and Sexual Activity    Alcohol use: Yes    Drug use: Never    Sexual activity: Not on file     Review of Systems   Constitutional: Negative.   Objective:     Vital Signs (Most Recent):    Vital Signs (24h Range):  BP: ()/()   Arterial Line BP: ()/()            There is no height or weight on file to calculate BMI.    No intake or output data in the 24 hours ending 03/21/22 0576    General    Vitals reviewed.  Constitutional: He is oriented to person, place, and time. He appears well-developed and well-nourished.   HENT:   Head: Normocephalic and  atraumatic.   Eyes: EOM are normal. Pupils are equal, round, and reactive to light.   Neck: Neck supple.   Cardiovascular:  Normal rate and regular rhythm.            Pulmonary/Chest: Effort normal and breath sounds normal.   Abdominal: Soft. Bowel sounds are normal.   Neurological: He is alert and oriented to person, place, and time. He has normal reflexes.   Psychiatric: He has a normal mood and affect. His behavior is normal.         Right Shoulder Exam   Right shoulder exam is normal.    Left Shoulder Exam     Tenderness   The patient is tender to palpation of the acromioclavicular joint, greater tuberosity and acromion.    Range of Motion   Active abduction:  abnormal   Passive abduction:  normal   Extension:  normal   Forward Flexion:  normal   Forward Elevation: normal  Adduction: normal    Tests & Signs   Impingement: positive    Other   Sensation: normal       Muscle Strength   Left Upper Extremity  Shoulder Abduction: 4/5   Shoulder Internal Rotation: 5/5   Shoulder External Rotation: 5/5   Supraspinatus: 4/5   Subscapularis: 5/5   Biceps: 5/5     Vascular Exam       Left Pulses      Radial:                    2+      Significant Labs: All pertinent labs within the past 24 hours have been reviewed.    Significant Imaging: I have reviewed and interpreted all pertinent imaging results/findings.    Assessment/Plan:     No notes have been filed under this hospital service.  Service: Orthopedic Surgery      Nii Johnson MD  Orthopedics  [facility] - Orthopedic Periop Services

## 2022-03-22 NOTE — BRIEF OP NOTE
[facility] - Orthopedic Periop Services  Brief Operative Note    Surgery Date: 3/22/2022     Surgeon(s) and Role:     * Nii Johnson MD - Primary    Assisting Surgeon: None    Pre-op Diagnosis:  Impingement syndrome of shoulder, left [M75.42]    Post-op Diagnosis:  Post-Op Diagnosis Codes:     * Impingement syndrome of shoulder, left [M75.42]    Procedure(s) (LRB):  ARTHROSCOPY, SHOULDER (Left)    Anesthesia:  General/block    Description of the findings of the procedure(s): See Op Note     Estimated Blood Loss: * No values recorded between 3/22/2022 11:15 AM and 3/22/2022 12:14 PM *5cc         Specimens:   Specimen (24h ago, onward)            None            Discharge Note    OUTCOME: Patient tolerated treatment/procedure well without complication and is now ready for discharge.    DISPOSITION: Home or Self Care    FINAL DIAGNOSIS:  Nontraumatic complete tear of left rotator cuff    FOLLOWUP: In clinic    DISCHARGE INSTRUCTIONS:    Discharge Procedure Orders   Diet general     Keep surgical extremity elevated     Ice to affected area   Order Comments: using barrier between ice and skin (specify duration&frequency)     Remove dressing in 72 hours   Order Comments: Keep dressing in place for 72 hours     Change dressing (specify)   Order Comments: Dressing change: one time per day beginning 72 hours post op.     Call MD for:  temperature >100.4     Call MD for:  persistent nausea and vomiting     Call MD for:  severe uncontrolled pain     Call MD for:  difficulty breathing, headache or visual disturbances     Call MD for:  redness, tenderness, or signs of infection (pain, swelling, redness, odor or green/yellow discharge around incision site)     Call MD for:  hives     Call MD for:  persistent dizziness or light-headedness     Call MD for:  extreme fatigue     Activity as tolerated     Shower on day dressing removed (No bath)     Weight bearing as tolerated

## 2022-03-22 NOTE — ANESTHESIA PROCEDURE NOTES
Peripheral Block    Patient location during procedure: OR   Block not for primary anesthetic.  Reason for block: at surgeon's request and post-op pain management   Post-op Pain Location: lt shoulder scope   Start time: 3/22/2022 10:04 AM  Timeout: 3/22/2022 10:03 AM   End time: 3/22/2022 10:10 AM    Staffing  Authorizing Provider: Fernando Castanon MD  Performing Provider: Fernando Castanon MD    Preanesthetic Checklist  Completed: patient identified, IV checked, site marked, risks and benefits discussed, surgical consent, monitors and equipment checked, pre-op evaluation and timeout performed  Peripheral Block  Patient position: sitting  Prep: ChloraPrep  Patient monitoring: heart rate, continuous pulse ox, continuous capnometry, frequent blood pressure checks and cardiac monitor  Block type: interscalene  Laterality: left  Injection technique: single shot  Needle  Needle type: Stimuplex   Needle gauge: 20 G  Needle length: 2 in  Needle localization: nerve stimulator and ultrasound guidance   -ultrasound image captured on disc.  Assessment  Injection assessment: negative aspiration, negative parasthesia and local visualized surrounding nerve  Paresthesia pain: none  Heart rate change: no  Slow fractionated injection: yes  Pain Tolerance: comfortable throughout block  Medications:    Medications: ROPIvacaine (NAROPIN) injection 0.75% - Perineural   30 mL - 3/22/2022 10:10:00 AM

## 2022-03-22 NOTE — DISCHARGE SUMMARY
[facility] - Orthopedic Periop Services  Discharge Note  Short Stay    Procedure(s) (LRB):  ARTHROSCOPY, SHOULDER (Left)    OUTCOME: Condition has improved and patient is now ready for discharge.    DISPOSITION: Home or Self Care    FINAL DIAGNOSIS:  Nontraumatic complete tear of left rotator cuff    FOLLOWUP: In clinic    DISCHARGE INSTRUCTIONS:    Discharge Procedure Orders   Diet general     Keep surgical extremity elevated     Ice to affected area   Order Comments: using barrier between ice and skin (specify duration&frequency)     Remove dressing in 72 hours   Order Comments: Keep dressing in place for 72 hours     Change dressing (specify)   Order Comments: Dressing change: one time per day beginning 72 hours post op.     Call MD for:  temperature >100.4     Call MD for:  persistent nausea and vomiting     Call MD for:  severe uncontrolled pain     Call MD for:  difficulty breathing, headache or visual disturbances     Call MD for:  redness, tenderness, or signs of infection (pain, swelling, redness, odor or green/yellow discharge around incision site)     Call MD for:  hives     Call MD for:  persistent dizziness or light-headedness     Call MD for:  extreme fatigue     Activity as tolerated     Shower on day dressing removed (No bath)     Weight bearing as tolerated        TIME SPENT ON DISCHARGE: 15 minutes

## 2022-03-22 NOTE — INTERVAL H&P NOTE
The patient has been examined and the H&P has been reviewed:    I concur with the findings and no changes have occurred since H&P was written.    Surgery risks, benefits and alternative options discussed and understood by patient/family.          Active Hospital Problems    Diagnosis  POA    *Nontraumatic complete tear of left rotator cuff [M75.122]  Yes      Resolved Hospital Problems   No resolved problems to display.

## 2022-03-22 NOTE — TRANSFER OF CARE
"Anesthesia Transfer of Care Note    Patient: Javad Garcia    Procedure(s) Performed: Procedure(s) (LRB):  ARTHROSCOPY, SHOULDER (Left)    Patient location: PACU    Anesthesia Type: general    Transport from OR: Transported from OR on 2-3 L/min O2 by NC with adequate spontaneous ventilation    Post pain: adequate analgesia    Post assessment: no apparent anesthetic complications and tolerated procedure well    Post vital signs: stable    Level of consciousness: awake, alert and oriented    Nausea/Vomiting: no nausea/vomiting    Complications: none    Transfer of care protocol was followed      Last vitals:   Visit Vitals  /68 (BP Location: Right leg, Patient Position: Lying)   Pulse 103   Temp 36.6 °C (97.9 °F) (Oral)   Resp 20   Ht 5' 9" (1.753 m)   Wt 97.1 kg (214 lb)   SpO2 98%   BMI 31.60 kg/m²     "

## 2022-03-22 NOTE — ANESTHESIA PROCEDURE NOTES
Intubation    Date/Time: 3/22/2022 10:14 AM  Performed by: Feliciano Siegel CRNA  Authorized by: Fernando Castanon MD     Intubation:     Induction:  Intravenous    Intubated:  Postinduction    Mask Ventilation:  Easy mask    Attempts:  1    Attempted By:  CRNA    Method of Intubation:  Video laryngoscopy    Laryngeal View Grade: Grade I - full view of cords      Difficult Airway Encountered?: No      Complications:  None    Airway Device:  Oral endotracheal tube    Airway Device Size:  7.5    Style/Cuff Inflation:  Cuffed    Tube secured:  22    Secured at:  The lips (inflated to minimal occlusive pressure)    Placement Verified By:  Colorimetric ETCO2 device    Complicating Factors:  Poor neck/head extension, oropharyngeal edema or fat, short neck and retrognathia    Findings Post-Intubation:  BS equal bilateral and atraumatic/condition of teeth unchanged

## 2022-03-22 NOTE — HPI
Chief complaint :left shoulder pain  History:Javad Gacria is a 52 y.o. male seen today for evaluation of left shoulder pain.  Symptoms began insidiously 6 months ago.  No history of trauma.  He is right-hand dominant.  Has a history of of right forearm fracture occurring at work as result of fallen wooden pallets.  Reportedly developed RSD.  He has had right carpal tunnel surgical release.  He has [facility] for his right upper extremity RSD and is disabled as a result.  There is no history of injury to the left shoulder.  Experiences pain with shoulder AP duction internal rotation.  There is a positive component of night pain.  He underwent MRI examination of left shoulder on 02/22/2022.  The MRI shows high-grade tearing of the supraspinatus tendon measuring 2.6 cm.  X-rays left shoulder two views:  Bones well mineralized.  Glenohumeral joint is located.  There is sclerosis involving the greater tuberosity.  No evidence of acute fracture, dislocation or pathologic bone.   Impression:  Impingement syndrome left shoulder with rotator cuff tear  Plan:  Left shoulder arthroscopic subacromial decompression, possible rotator cuff repair.  The potential benefits and risks of surgery outlined to include but not limited to bleeding, infection, damage to blood vessels and nerves, need for further surgery, other risks and complications including even death the patient wished to proceed.  We discussed outpatient general/regional block anesthesia

## 2022-03-22 NOTE — OP NOTE
[facility] - Orthopedic Periop Services  Surgery Department  Operative Note    SUMMARY     Date of Procedure: 3/22/2022     Procedure: Procedure(s) (LRB):  ARTHROSCOPY, SHOULDER (Left)     Surgeon(s) and Role:     * Nii Johnsno MD - Primary    Assisting Surgeon: None    Pre-Operative Diagnosis: Impingement syndrome of shoulder, left [M75.42]    Post-Operative Diagnosis: Post-Op Diagnosis Codes:     * Impingement syndrome of shoulder, left [M75.42]    Anesthesia: general  Technical Procedures Used:                           DEPARTMENT OF ORTHOPEDIC SURGERY                OPERATIVE REPORT     NAME:  Javad Garcia  MRN: [id number]     DATE OF SURGERY:  3/22/2022    PREOPERATIVE DIAGNOSIS: left shoulder internal derangement       POSTOPERATIVE DIAGNOSIS:  Impingement syndrome/full-thickness rotator cuff tear    ANESTHESIA:  General/ Regional block    PROCEDURE:  Examination under anesthesia, arthroscopy with extensive debridement, bursectomy, coracoacromial ligament resection, Gale distal clavicle resection (6-8 mm), acromioplasty - left shoulder      PROCEDURE IN DETAIL:  The patient was taken to the operating room and placed in the supine position.  After adequate level of general anesthesia had been achieved (see anesthesia note) patients left shoulder was examined.  left shoulder normal contour.  There is full passive range of motion of the shoulder without instability signs.  left shoulder and upper extremity was scrubbed with Betadine and draped in sterile fashion.  The operation was begun by inflating the joint with sterile saline through a posterior approach using an 18 gauge spinal needle.  Now a linear skin incision was made over the anterior aspect of the right shoulder for introduction of the blunt arthroscopy cannula.  Intraarticular positioning was confirmed with the arthroscopy camera. The anterior portal was established though an anterior incision made lateral to the coracoid process. The joint was  entered through a trans-subscapularis muscle approach.  Inspection of glenohumeral joint revealed normal cartilaginous surfaces.  There is some degenerative tearing of the anterior labrum.  This was debrided the shaver and contoured with radiofrequency Wand.  The biceps anchor was intact.  Full-thickness tear of the supraspinatus tendon was identified.  The undersurface debrided with shaver.  The joint was irrigated antibiotic solution and the arthroscopy equipment was withdrawn.  Locking was redirected from the posterior portal superiorly into the subacromial space.  Anterior and lateral portals were established.  A bursectomy was performed the she.  Full-thickness tear of the supraspinatus tendon was identified.  Coracoacromial ligaments taken down the radiofrequency Wand.  A Gael distal clavicle resection and acromioplasty was performed with a barrel bur.  Good subacromial decompression was confirmed with multiple portal viewing.  Tenderness turned back to the rotator cuff tear.  2 mm FiberTape suture was placed through the torn retracted portion of the rotator cuff tendon with the Arthrex Viper suture Passer.  A punch hole was made in the base of the greater tuberosity.  The 2 limbs of the Arthrex FiberTape were engaged with Arthrex bio SwiveLock.  The cuff tear was tensioned and the SwiveLock implanted securing the rotator cuff tear in good position.  The excess sutures removed.  Now, the subacromial space was irrigated with saline solution and arthroscope withdrawn.  The stab wounds were reapproximated with interrupted 4-0 suture.  The wounds dressed sterilely and arm sling applied.  The patient was taken to the recovery room in satisfactory condition.  ESTIMATED BLOOD LOSS:  5ccs.  The patient received Ancef antibiotic intravenously prior to the procedure.      Nii Johnson MD            Complications: No    Estimated Blood Loss (EBL): * No values recorded between 3/22/2022 11:15 AM and 3/22/2022  12:14 PM *           Implants:   Implant Name Type Inv. Item Serial No.  Lot No. LRB No. Used Action   IMP SUTURE ANCHOR SWVLKC CLD [id number] - [id number]  IMP SUTURE ANCHOR SWVLKC CLD 4.75X19.1MM  [employer] ([facility]) [id number] Left 1 Implanted       Specimens:   Specimen (24h ago, onward)            None                  Condition: Good    Disposition: PACU - hemodynamically stable.    Attestation: I was present and scrubbed for the entire procedure.

## 2022-03-22 NOTE — ANESTHESIA POSTPROCEDURE EVALUATION
Anesthesia Post Evaluation    Patient: Javad Garcia    Procedure(s) Performed: Procedure(s) (LRB):  ARTHROSCOPY, SHOULDER (Left)    Final Anesthesia Type: general      Patient location during evaluation: PACU  Patient participation: Yes- Able to Participate  Level of consciousness: awake and sedated  Post-procedure vital signs: reviewed and stable  Pain management: adequate  Airway patency: patent    PONV status at discharge: No PONV  Anesthetic complications: no      Cardiovascular status: blood pressure returned to baseline  Respiratory status: unassisted  Hydration status: euvolemic  Follow-up not needed.          Vitals Value Taken Time   /66 03/22/22 1330   Temp 36.7 °C (98.1 °F) 03/22/22 1400   Pulse 95 03/22/22 1345   Resp 16 03/22/22 1345   SpO2 94 % 03/22/22 1345         Event Time   Out of Recovery 12:55:00         Pain/Fredy Score: Fredy Score: 10 (3/22/2022 12:45 PM)

## 2022-03-22 NOTE — PLAN OF CARE
1214  Pt to pacu resp reg and non labored pt dsg d/i to l shoulder pt sao2 96% on ra pt with no motor  Or neuro to l arm pain level 0 will monitor     1235 pt vs stable pt resting well pt sao2 95% on ra pt etco2 40 pt voices no complaints  No orders to note in per md    1255 pt vs stable pt resting well pt voices no complaints orders to release to room per md    1300 pt released to a karey rn b/p 113/66 pulse 93 resp 16 sao2 94% on ra

## 2022-04-05 PROBLEM — Z98.890 STATUS POST LEFT ROTATOR CUFF REPAIR: Status: ACTIVE | Noted: 2022-04-05

## 2022-05-04 ENCOUNTER — CLINICAL SUPPORT (OUTPATIENT)
Dept: REHABILITATION | Facility: HOSPITAL | Age: 52
End: 2022-05-04
Attending: NURSE PRACTITIONER
Payer: MEDICAID

## 2022-05-04 DIAGNOSIS — M25.612 DECREASED ROM OF LEFT SHOULDER: ICD-10-CM

## 2022-05-04 DIAGNOSIS — M25.512 CHRONIC LEFT SHOULDER PAIN: ICD-10-CM

## 2022-05-04 DIAGNOSIS — Z98.890 S/P LEFT ROTATOR CUFF REPAIR: Primary | ICD-10-CM

## 2022-05-04 DIAGNOSIS — G89.29 CHRONIC LEFT SHOULDER PAIN: ICD-10-CM

## 2022-05-04 PROCEDURE — 97161 PT EVAL LOW COMPLEX 20 MIN: CPT

## 2022-05-04 NOTE — PLAN OF CARE
[facility]   Physical Therapy Initial Evaluation    Name: Javad Garcia  Clinic Number: [id number]    Therapy Diagnosis:   Encounter Diagnosis   Name Primary?    S/P left rotator cuff repair      Physician: Feroz Encinas FNP    Physician Orders: PT Eval and Treat   Medical Diagnosis from Referral: s/p left rotator cuff repair   Evaluation Date: 5/4/2022  Authorization Period Expiration: 5/4/2022 - evaluation only approved  Plan of Care Expiration: 7/29/2022  Progress Note Due: 6/4/2022  Visit # / Visits authorized: 1/ 1 - evaluation only approved    Time In: 1135 am  Time Out: 1222 pm  Total Appointment Time (timed & untimed codes): 47 minutes    Precautions: Standard    Subjective   Date of onset: 3/22/2022  History of current condition - Javad reports: patient underwent left rotator cuff repair on 3/22/2022 - he is right hand dominant but 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    Medications:   Javad has a current medication list which includes the following prescription(s): cetirizine, duloxetine, fluticasone propionate, lisinopril-hydrochlorothiazide, lovastatin, morphine, norco, paliperidone, pantoprazole, and triamcinolone acetonide 0.1%.    Allergies:   Review of patient's allergies indicates:   Allergen Reactions    Oxycodone-acetaminophen         Imaging, xrays    Prior Therapy: none  Social History:  lives with their spouse  Occupation: not working prior to surgery  Prior Level of Function: independent   Current Level of Function: modified  independent     Pain:  Current 6/10, worst 8/10  Location: left shoulder   Description: Aching, Throbbing and Variable  Aggravating Factors: Laying, and showering/getting dressed  Easing Factors: pain medication and ice    Pts goals: full use of left arm - patient has limited use of right hand so this is very important    Objective       Posture: rounded shoulders yes, forward head no, increased kyphotic curve yes, decreased lordotic curve no  Clear cervical spine: Spurling's test negative    Range of motion  Motion Right  Left    Shoulder flexion WITHIN FUNCTIONAL LIMITS 72 degrees actively; 100 degrees passively in supine   Shoulder extension WITHIN FUNCTIONAL LIMITS 35 degrees actively   Shoulder abduction WITHIN FUNCTIONAL LIMITS NT   Shoulder internal rotation WITHIN FUNCTIONAL LIMITS ischial tuberosity; 40 degrees passively in supine   Shoulder external rotation WITHIN FUNCTIONAL LIMITS 28  degrees in adduction; 35 degrees passively in supine       MANUAL MUSCLE TEST  Muscle Right  Left    Shoulder flexion MMT strength: 5/5 NT   Shoulder extension MMT strength: 5/5 NT   Shoulder abduction MMT strength: 5/5 NT   Shoulder internal rotation MMT strength: 5/5 NT   Shoulder external rotation MMT strength: 5/5 NT   Elbow flexion MMT strength: 5/5 NT   Elbow extension MMT strength: 5/5 NT     Functional ability:  Dressing: mod A  Driving:  driving some  Overhead activity: unable  Work/hobbies:  was not working prior to surgery      Clinical test:  -not performed secondary to postop    Palpation:     Limitation/Restriction for FOTO Shoulder Survey    Therapist reviewed FOTO scores for Javad Garcia on 5/4/2022.   FOTO documents entered into [employer] - see Media section.    Intake Score: 4         TREATMENT   Total Treatment time (time-based codes) separate from Evaluation: 12 minutes    Javad received the treatments listed below:  Patient was instructed in the following home exercise program - pendulums, seated scapular  retraction, counter flexion slides, and elbow flexion/extension - he received passive stretching to the left shoulder for flexion, external rotation and internal rotation in supine    Home Exercises and Patient Education Provided    Education provided:   - evaluation results, plan of care and home exercise program; instructions for weaning from sling    Written Home Exercises Provided: yes.  Exercises were reviewed and Javad was able to demonstrate them prior to the end of the session.  Javad demonstrated good  understanding of the education provided.     See EMR under Patient Instructions for exercises provided 5/4/2022.    Assessment   Javad is a 52 y.o. male referred to outpatient Physical Therapy with a medical diagnosis of s/p left rotator cuff repair. Pt presents with typical postop symptoms of pain, decreased range of motion, decreased strength, poor scapular stability and very limited functional use of left upper extremity. 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 He is able to sleep in the bed propped on pillows. He is back driving some. He still requires assistance with dressing and showering. He was still in the sling upon arrival but instructed patient to begin weaning out of the sling at home and only wear it when he goes outside or out into the community.     Pt prognosis is Good.   Pt will benefit from skilled outpatient Physical Therapy to address the deficits stated above and in the chart below, provide pt/family education, and to maximize pt's level of independence.     Plan of care discussed with patient: Yes  Pt's spiritual, cultural and educational needs considered and patient is agreeable to the plan of care and goals as stated below:     Anticipated Barriers for therapy: none      Short Term Goals: 6 weeks  1. Patient will be independent with home exercise  program.  2. Patient will have passive range of motion as follows - 130 degrees flexion, 45 degrees external rotation and internal rotation.  3. Patient will be independent with dressing and driving.  4. Patient will be able to sleep all night in bed.    Long Term Goals: 12 weeks  1. Patient will have active range of motion as follow - 140 degrees flexion, 60 degrees external rotation and functional internal rotation to T12 for reaching overhead, behind head and behind back for dressing, grooming and hygiene.  2. Patient will have 4+/5 strength left shoulder/upper extremity to perform household chores and work related tasks.  3. Patient will place 3# dumbbell on head height shelf without hiking or pain left shoulder.  4. Patient will return to performing all household and outdoor chores.      Plan   Plan of care Certification: 5/4/2022 to 7/29/2022.    Outpatient Physical Therapy 2 times weekly for 12 weeks to include the following interventions: Electrical Stimulation premod/IFC/hivolt as needed, Manual Therapy, Moist Heat/ Ice, Neuromuscular Re-ed, Patient Education, Therapeutic Activities, Therapeutic Exercise and Ultrasound.     MURIEL SHAH, PT

## 2022-05-05 PROBLEM — M25.512 LEFT SHOULDER PAIN: Status: ACTIVE | Noted: 2022-05-05

## 2022-05-05 PROBLEM — M25.612 DECREASED ROM OF LEFT SHOULDER: Status: ACTIVE | Noted: 2022-05-05

## 2022-05-12 ENCOUNTER — CLINICAL SUPPORT (OUTPATIENT)
Dept: REHABILITATION | Facility: HOSPITAL | Age: 52
End: 2022-05-12
Attending: NURSE PRACTITIONER
Payer: MEDICAID

## 2022-05-12 DIAGNOSIS — M25.512 LEFT SHOULDER PAIN, UNSPECIFIED CHRONICITY: ICD-10-CM

## 2022-05-12 DIAGNOSIS — Z98.890 S/P LEFT ROTATOR CUFF REPAIR: ICD-10-CM

## 2022-05-12 DIAGNOSIS — M25.612 DECREASED ROM OF LEFT SHOULDER: Primary | ICD-10-CM

## 2022-05-12 PROCEDURE — 97140 MANUAL THERAPY 1/> REGIONS: CPT | Mod: CQ

## 2022-05-12 PROCEDURE — 97530 THERAPEUTIC ACTIVITIES: CPT | Mod: CQ

## 2022-05-12 PROCEDURE — 97112 NEUROMUSCULAR REEDUCATION: CPT | Mod: CQ

## 2022-05-12 NOTE — PROGRESS NOTES
[facility]   Physical Therapy Treatment Note     Name: Javad Garcia  Clinic Number: [id number]    Therapy Diagnosis:   Encounter Diagnoses   Name Primary?    Decreased ROM of left shoulder Yes    S/P left rotator cuff repair     Left shoulder pain, unspecified chronicity      Physician: Feroz Encinas FNP    Visit Date: 5/12/2022    Physician Orders: PT Eval and Treat   Medical Diagnosis from Referral: s/p left rotator cuff repair   Evaluation Date: 5/4/2022  Authorization Period Expiration: 7/29/2022   Plan of Care Expiration: 7/29/2022  Progress Note Due: 6/4/2022  Visit # / Visits authorized: 2/ 24    PTA Visit #: 1/5     Time In: 7:05 am  Time Out: 7:45 am   Total Billable Time: 40 minutes    SUBJECTIVE     Pt reports: he is doing pretty good.  He was compliant with home exercise program.  Response to previous treatment: no complaint   Functional change: n/a    Pain: 7/10  Location: left shoulder      OBJECTIVE     Objective Measures updated at progress report unless specified.     Treatment     Javad received the treatments listed below:      therapeutic exercises to develop strength, ROM and flexibility for 9 minutes including:  UBE x 3 minutes   Supine elbow flexion/ extension x 30  Supine internal rotation/ external rotation x 20    manual therapy techniques: Joint mobilizations, Myofacial release and Soft tissue Mobilization were applied to the: left shoulder for 15 minutes, including:  Gentle passive stretching into flexion and external rotation   Grade 1 joint mobilizations to promote relaxation and pain relief    neuromuscular re-education activities to improve: Coordination and Posture for 8 minutes. The following activities were included:  scapular proprioceptive neuromuscular facilitation x 30  Seated scapular retraction x 20    therapeutic activities to improve functional performance for 8 minutes, including:  Pulleys x 20 with 10 second hold   Ball roll flexion x 20  active assistive         Patient Education and Home Exercises     Home Exercises Provided and Patient Education Provided     Education provided:   - review of home exercise program; encouragement to discharge sling at home    Written Home Exercises Provided: Patient instructed to cont prior HEP. Exercises were reviewed and Javad was able to demonstrate them prior to the end of the session.  Javad demonstrated good  understanding of the education provided. See EMR under Patient Instructions for exercises provided during therapy sessions    ASSESSMENT     Javad is a 52 y.o. male referred to outpatient Physical Therapy with a medical diagnosis of s/p left rotator cuff repair. Pt presents with typical postop symptoms of pain, decreased range of motion, decreased strength, poor scapular stability and very limited functional use of left upper extremity. 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 He is able to sleep in the bed propped on pillows. He is back driving some. He still requires assistance with dressing and showering. He was still in the sling upon arrival but instructed patient to begin weaning out of the sling at home and only wear it when he goes outside or out into the community.      Javad is still wearing his sling all the time. He was reminded to take the sling off and relax his arm at home. Passively he had 115 degrees flexion and 25 degrees external rotation in scaption with tight end-feels. He did well with exercises with minimal cues.    Javad Is progressing towards his goals.   Pt prognosis is Good.     Pt will continue to benefit from skilled outpatient physical therapy to address the deficits listed in the problem list box on initial evaluation, provide pt/family education and to maximize pt's level of independence in the home and community environment.     Pt's spiritual,  cultural and educational needs considered and pt agreeable to plan of care and goals.     Anticipated barriers to physical therapy: none    Goals:   Short Term Goals: 6 weeks  1. Patient will be independent with home exercise program.  2. Patient will have passive range of motion as follows - 130 degrees flexion, 45 degrees external rotation and internal rotation.  3. Patient will be independent with dressing and driving.  4. Patient will be able to sleep all night in bed.     Long Term Goals: 12 weeks  1. Patient will have active range of motion as follow - 140 degrees flexion, 60 degrees external rotation and functional internal rotation to T12 for reaching overhead, behind head and behind back for dressing, grooming and hygiene.  2. Patient will have 4+/5 strength left shoulder/upper extremity to perform household chores and work related tasks.  3. Patient will place 3# dumbbell on head height shelf without hiking or pain left shoulder.  4. Patient will return to performing all household and outdoor chores.      PLAN     Continue Plan of Care for Outpatient Physical Therapy 2 times weekly for 12 weeks to include the following interventions: Electrical Stimulation premod/IFC/hivolt as needed, Manual Therapy, Moist Heat/ Ice, Neuromuscular Re-ed, Patient Education, Therapeutic Activities, Therapeutic Exercise and Ultrasound.        Kaylee Albert, PTA

## 2022-05-17 ENCOUNTER — CLINICAL SUPPORT (OUTPATIENT)
Dept: REHABILITATION | Facility: HOSPITAL | Age: 52
End: 2022-05-17
Payer: MEDICAID

## 2022-05-17 DIAGNOSIS — M25.512 CHRONIC LEFT SHOULDER PAIN: ICD-10-CM

## 2022-05-17 DIAGNOSIS — G89.29 CHRONIC LEFT SHOULDER PAIN: ICD-10-CM

## 2022-05-17 DIAGNOSIS — M25.612 DECREASED ROM OF LEFT SHOULDER: Primary | ICD-10-CM

## 2022-05-17 DIAGNOSIS — Z98.890 S/P LEFT ROTATOR CUFF REPAIR: ICD-10-CM

## 2022-05-17 PROCEDURE — 97530 THERAPEUTIC ACTIVITIES: CPT

## 2022-05-17 PROCEDURE — 97140 MANUAL THERAPY 1/> REGIONS: CPT

## 2022-05-17 PROCEDURE — 97112 NEUROMUSCULAR REEDUCATION: CPT

## 2022-05-17 NOTE — PROGRESS NOTES
[facility]   Physical Therapy Treatment Note     Name: Javad Garcia  Clinic Number: [id number]    Therapy Diagnosis:   Encounter Diagnoses   Name Primary?    Decreased ROM of left shoulder Yes    S/P left rotator cuff repair     Chronic left shoulder pain      Physician: Feroz Encinas FNP    Visit Date: 5/17/2022    Physician Orders: PT Eval and Treat   Medical Diagnosis from Referral: s/p left rotator cuff repair   Evaluation Date: 5/4/2022  Authorization Period Expiration: 7/29/2022   Plan of Care Expiration: 7/29/2022  Progress Note Due: 6/4/2022  Visit # / Visits authorized: 3/24    PTA Visit #:     Time In: 916 am  Time Out: 1000 am   Total Billable Time: 41 minutes    SUBJECTIVE     Pt reports: he is doing pretty good.  He was compliant with home exercise program.  Response to previous treatment: no complaint   Functional change: n/a    Pain: 7/10  Location: left shoulder      OBJECTIVE     Objective Measures updated at progress report unless specified.     Treatment     Javad received the treatments listed below:      therapeutic exercises to develop strength, ROM and flexibility for 9 minutes including:  UBE x 6 minutes   Supine elbow flexion/extension 2# x 30  Supine internal rotation/ external rotation w/ manual    manual therapy techniques: Joint mobilizations, Myofacial release and Soft tissue Mobilization were applied to the: left shoulder for 15 minutes, including:  Gentle passive stretching into flexion and external rotation   Grade 1-2 joint mobilizations to promote relaxation and pain relief    neuromuscular re-education activities to improve: Coordination and Posture for 7 minutes. The following activities were included:  scapular proprioceptive neuromuscular facilitation x 30  Seated scapular retraction x 30    therapeutic activities to improve functional performance for 10 minutes, including:  Pulleys x 20 with 10 second hold   Ball roll flexion 10 x 10 second hold    Supine cane flexion 3 second hold x 10         Patient Education and Home Exercises     Home Exercises Provided and Patient Education Provided     Education provided:   - review of home exercise program; encouragement to discharge sling at home    Written Home Exercises Provided: Patient instructed to cont prior HEP. Exercises were reviewed and Javad was able to demonstrate them prior to the end of the session.  Javad demonstrated good  understanding of the education provided. See EMR under Patient Instructions for exercises provided during therapy sessions    ASSESSMENT     Javad is a 52 y.o. male referred to outpatient Physical Therapy with a medical diagnosis of s/p left rotator cuff repair. Pt presents with typical postop symptoms of pain, decreased range of motion, decreased strength, poor scapular stability and very limited functional use of left upper extremity. 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 He is able to sleep in the bed propped on pillows. He is back driving some. He still requires assistance with dressing and showering. He was still in the sling upon arrival but instructed patient to begin weaning out of the sling at home and only wear it when he goes outside or out into the community.      Javad is 8 weeks postop left rotator cuff repair. Javad was guarded with passive range of motion. He had fair scapular awareness with neuromuscular reeducation activities in sidelying. He tolerated above listed exercises without significant complaint. Will progress as tolerated.     Javad Is progressing towards his goals.   Pt prognosis is Good.     Pt will continue to benefit from skilled outpatient physical therapy to address the deficits listed in the problem list box on initial evaluation, provide pt/family education and to maximize pt's level of independence in the home and  community environment.     Pt's spiritual, cultural and educational needs considered and pt agreeable to plan of care and goals.     Anticipated barriers to physical therapy: none    Goals:   Short Term Goals: 6 weeks  1. Patient will be independent with home exercise program.  2. Patient will have passive range of motion as follows - 130 degrees flexion, 45 degrees external rotation and internal rotation.  3. Patient will be independent with dressing and driving.  4. Patient will be able to sleep all night in bed.     Long Term Goals: 12 weeks  1. Patient will have active range of motion as follow - 140 degrees flexion, 60 degrees external rotation and functional internal rotation to T12 for reaching overhead, behind head and behind back for dressing, grooming and hygiene.  2. Patient will have 4+/5 strength left shoulder/upper extremity to perform household chores and work related tasks.  3. Patient will place 3# dumbbell on head height shelf without hiking or pain left shoulder.  4. Patient will return to performing all household and outdoor chores.      PLAN     Continue Plan of Care for Outpatient Physical Therapy 2 times weekly for 12 weeks to include the following interventions: Electrical Stimulation premod/IFC/hivolt as needed, Manual Therapy, Moist Heat/ Ice, Neuromuscular Re-ed, Patient Education, Therapeutic Activities, Therapeutic Exercise and Ultrasound.        MURIEL SHAH, PT

## 2022-05-19 ENCOUNTER — CLINICAL SUPPORT (OUTPATIENT)
Dept: REHABILITATION | Facility: HOSPITAL | Age: 52
End: 2022-05-19
Attending: NURSE PRACTITIONER
Payer: MEDICAID

## 2022-05-19 DIAGNOSIS — M25.512 CHRONIC LEFT SHOULDER PAIN: ICD-10-CM

## 2022-05-19 DIAGNOSIS — Z98.890 S/P LEFT ROTATOR CUFF REPAIR: ICD-10-CM

## 2022-05-19 DIAGNOSIS — M25.612 DECREASED ROM OF LEFT SHOULDER: Primary | ICD-10-CM

## 2022-05-19 DIAGNOSIS — G89.29 CHRONIC LEFT SHOULDER PAIN: ICD-10-CM

## 2022-05-19 PROCEDURE — 97530 THERAPEUTIC ACTIVITIES: CPT

## 2022-05-19 PROCEDURE — 97112 NEUROMUSCULAR REEDUCATION: CPT

## 2022-05-19 PROCEDURE — 97140 MANUAL THERAPY 1/> REGIONS: CPT

## 2022-05-19 NOTE — PROGRESS NOTES
[facility]   Physical Therapy Treatment Note     Name: Javad Garcia  Clinic Number: [id number]    Therapy Diagnosis:   Encounter Diagnoses   Name Primary?    Decreased ROM of left shoulder Yes    S/P left rotator cuff repair     Chronic left shoulder pain      Physician: Feroz Encinas FNP    Visit Date: 5/19/2022    Physician Orders: PT Eval and Treat   Medical Diagnosis from Referral: s/p left rotator cuff repair   Evaluation Date: 5/4/2022  Authorization Period Expiration: 7/29/2022   Plan of Care Expiration: 7/29/2022  Progress Note Due: 6/4/2022  Visit # / Visits authorized: 4/24    PTA Visit #:     Time In: 704 am  Time Out: 750 am   Total Billable Time: 45 minutes    SUBJECTIVE     Pt reports: he is doing better today than last visit  He was compliant with home exercise program.  Response to previous treatment: no complaint   Functional change: n/a    Pain: 4/10  Location: left shoulder      OBJECTIVE     Active range of motion left shoulder: flexion 115 degrees, 35 degrees external rotation in adduction, and internal rotation to glute med  Passive range of motion left shoulder flexion 130 degrees     Treatment     Javad received the treatments listed below:      therapeutic exercises to develop strength, ROM and flexibility for 5 minutes including:  UBE x 5 minutes   Supine elbow flexion/extension --  Supine internal rotation/ external rotation w/ manual    manual therapy techniques: Joint mobilizations, Myofacial release and Soft tissue Mobilization were applied to the: left shoulder for 14 minutes, including:  Gentle passive stretching into flexion and external rotation   Grade 1-2 joint mobilizations to promote relaxation and pain relief    neuromuscular re-education activities to improve: Coordination and Posture for 10 minutes. The following activities were included:  scapular proprioceptive neuromuscular facilitation x 30  Seated scapular retraction --  Sidelying external  rotation 3 x 10   Sidelying scaption 3 x 10    therapeutic activities to improve functional performance for 16 minutes, including:  Pulleys x 5 minutes   Ball roll flexion x 20  Supine cane flexion 3 second hold x 15  Cane flexion on rail x 20        Patient Education and Home Exercises     Home Exercises Provided and Patient Education Provided     Education provided:   - review of home exercise program; encouragement to discharge sling at home    Written Home Exercises Provided: Patient instructed to cont prior HEP. Exercises were reviewed and Javad was able to demonstrate them prior to the end of the session.  Javad demonstrated good  understanding of the education provided. See EMR under Patient Instructions for exercises provided during therapy sessions    ASSESSMENT     Javad is a 52 y.o. male referred to outpatient Physical Therapy with a medical diagnosis of s/p left rotator cuff repair. Pt presents with typical postop symptoms of pain, decreased range of motion, decreased strength, poor scapular stability and very limited functional use of left upper extremity. 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 He is able to sleep in the bed propped on pillows. He is back driving some. He still requires assistance with dressing and showering. He was still in the sling upon arrival but instructed patient to begin weaning out of the sling at home and only wear it when he goes outside or out into the community.      Javad is 8 weeks postop left rotator cuff repair. Javad was guarded with passive range of motion. He had fair scapular awareness with neuromuscular reeducation activities in sidelying. He tolerated above listed exercises without significant complaint. He had 130 degrees passive shoulder flexion and 115 degrees active. He is most limited in internal rotation only reaching behind back to  gluteus medius. His external rotation in adduction was 35 degrees. Will progress as tolerated.     Javad Is progressing towards his goals.   Pt prognosis is Good.     Pt will continue to benefit from skilled outpatient physical therapy to address the deficits listed in the problem list box on initial evaluation, provide pt/family education and to maximize pt's level of independence in the home and community environment.     Pt's spiritual, cultural and educational needs considered and pt agreeable to plan of care and goals.     Anticipated barriers to physical therapy: none    Goals:   Short Term Goals: 6 weeks  1. Patient will be independent with home exercise program.  2. Patient will have passive range of motion as follows - 130 degrees flexion, 45 degrees external rotation and internal rotation.  3. Patient will be independent with dressing and driving.  4. Patient will be able to sleep all night in bed.     Long Term Goals: 12 weeks  1. Patient will have active range of motion as follow - 140 degrees flexion, 60 degrees external rotation and functional internal rotation to T12 for reaching overhead, behind head and behind back for dressing, grooming and hygiene.  2. Patient will have 4+/5 strength left shoulder/upper extremity to perform household chores and work related tasks.  3. Patient will place 3# dumbbell on head height shelf without hiking or pain left shoulder.  4. Patient will return to performing all household and outdoor chores.      PLAN     Continue Plan of Care for Outpatient Physical Therapy 2 times weekly for 12 weeks to include the following interventions: Electrical Stimulation premod/IFC/hivolt as needed, Manual Therapy, Moist Heat/ Ice, Neuromuscular Re-ed, Patient Education, Therapeutic Activities, Therapeutic Exercise and Ultrasound.        MURIEL SHAH, PT

## 2022-05-23 ENCOUNTER — CLINICAL SUPPORT (OUTPATIENT)
Dept: REHABILITATION | Facility: HOSPITAL | Age: 52
End: 2022-05-23
Attending: NURSE PRACTITIONER
Payer: MEDICAID

## 2022-05-23 DIAGNOSIS — Z98.890 S/P LEFT ROTATOR CUFF REPAIR: ICD-10-CM

## 2022-05-23 DIAGNOSIS — M25.512 LEFT SHOULDER PAIN, UNSPECIFIED CHRONICITY: ICD-10-CM

## 2022-05-23 DIAGNOSIS — M25.612 DECREASED ROM OF LEFT SHOULDER: Primary | ICD-10-CM

## 2022-05-23 PROCEDURE — 97112 NEUROMUSCULAR REEDUCATION: CPT | Mod: CQ

## 2022-05-23 PROCEDURE — 97530 THERAPEUTIC ACTIVITIES: CPT | Mod: CQ

## 2022-05-23 PROCEDURE — 97140 MANUAL THERAPY 1/> REGIONS: CPT | Mod: CQ

## 2022-05-23 NOTE — PROGRESS NOTES
"[facility]   Physical Therapy Treatment Note     Name: Javad Garcia  Clinic Number: [id number]    Therapy Diagnosis:   Encounter Diagnoses   Name Primary?    Decreased ROM of left shoulder Yes    S/P left rotator cuff repair     Left shoulder pain, unspecified chronicity      Physician: Feroz Encinas FNP    Visit Date: 5/23/2022    Physician Orders: PT Eval and Treat   Medical Diagnosis from Referral: s/p left rotator cuff repair   Evaluation Date: 5/4/2022  Authorization Period Expiration: 7/29/2022   Plan of Care Expiration: 7/29/2022  Progress Note Due: 6/4/2022  Visit # / Visits authorized: 5/24    PTA Visit #: 1    Time In: 10:00 am  Time Out: 10:42 am   Total Billable Time: 42 minutes    SUBJECTIVE     Pt reports: his shoulder is "still gavin hurting."  He was compliant with home exercise program.  Response to previous treatment: no complaint   Functional change: getting a little bit easier to move, able to sleep at night, driving    Pain: 6-7/10  Location: left shoulder      OBJECTIVE     Active range of motion left shoulder: flexion 125 degrees, 35 degrees external rotation in adduction, and internal rotation to PSIS  Passive range of motion left shoulder flexion 130 degrees with muscle guarding    Treatment     Javad received the treatments listed below:      therapeutic exercises to develop strength, ROM and flexibility for 8 minutes including:  UBE x 5 minutes   Cybex rows with 3 plates, close  3 x 10    manual therapy techniques: Joint mobilizations, Myofacial release and Soft tissue Mobilization were applied to the: left shoulder for 15 minutes, including:  Gentle passive stretching into flexion and external rotation   Grade 1-2 joint mobilizations to promote relaxation and pain relief  Myofascial release to subscapularis and upper trap  Percussion gun to parascapular muscles    neuromuscular re-education activities to improve: Coordination and Posture for 10 minutes. " The following activities were included:  scapular proprioceptive neuromuscular facilitation x 30  Sidelying external rotation 2 x 10   Sidelying scaption 3 x 10  Scapular retraction with extension with blue theraband x 30  Close  pulldown with 1 plate 5 x 10 second hold (had difficulty with scapular depression, causing some pain - will hold for now)    therapeutic activities to improve functional performance for 9 minutes, including:  Pulleys x 5 minutes   Supine cane flexion 5 second hold x 10  Cane flexion on rail x 20        Patient Education and Home Exercises     Home Exercises Provided and Patient Education Provided     Education provided:   - review of home exercise program; encouragement to discharge sling at home    Written Home Exercises Provided: Patient instructed to cont prior HEP. Exercises were reviewed and Javad was able to demonstrate them prior to the end of the session.  Javad demonstrated good  understanding of the education provided. See EMR under Patient Instructions for exercises provided during therapy sessions    ASSESSMENT     Javad is a 52 y.o. male referred to outpatient Physical Therapy with a medical diagnosis of s/p left rotator cuff repair. Pt presents with typical postop symptoms of pain, decreased range of motion, decreased strength, poor scapular stability and very limited functional use of left upper extremity. 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 He is able to sleep in the bed propped on pillows. He is back driving some. He still requires assistance with dressing and showering. He was still in the sling upon arrival but instructed patient to begin weaning out of the sling at home and only wear it when he goes outside or out into the community.      Javad is 9 weeks postop left rotator cuff repair. He remains guarded with passive range of motion,  shaking in endrange flexion stretch due to resisting. He had fair scapular awareness with neuromuscular reeducation activities in sidelying. Attempted close  pulldown with 1 plate for active assistive flexion stretch but patient complained of pain. He had 130 degrees passive shoulder flexion and 125 degrees active. Will progress as tolerated.     Javad Is progressing towards his goals.   Pt prognosis is Good.     Pt will continue to benefit from skilled outpatient physical therapy to address the deficits listed in the problem list box on initial evaluation, provide pt/family education and to maximize pt's level of independence in the home and community environment.     Pt's spiritual, cultural and educational needs considered and pt agreeable to plan of care and goals.     Anticipated barriers to physical therapy: none    Goals:   Short Term Goals: 6 weeks  1. Patient will be independent with home exercise program.  2. Patient will have passive range of motion as follows - 130 degrees flexion, 45 degrees external rotation and internal rotation.  3. Patient will be independent with dressing and driving.  4. Patient will be able to sleep all night in bed.     Long Term Goals: 12 weeks  1. Patient will have active range of motion as follow - 140 degrees flexion, 60 degrees external rotation and functional internal rotation to T12 for reaching overhead, behind head and behind back for dressing, grooming and hygiene.  2. Patient will have 4+/5 strength left shoulder/upper extremity to perform household chores and work related tasks.  3. Patient will place 3# dumbbell on head height shelf without hiking or pain left shoulder.  4. Patient will return to performing all household and outdoor chores.      PLAN     Continue Plan of Care for Outpatient Physical Therapy 2 times weekly for 12 weeks to include the following interventions: Electrical Stimulation premod/IFC/hivolt as needed, Manual Therapy, Moist Heat/ Ice,  Neuromuscular Re-ed, Patient Education, Therapeutic Activities, Therapeutic Exercise and Ultrasound.        Kaylee Albert, PTA

## 2022-05-26 ENCOUNTER — CLINICAL SUPPORT (OUTPATIENT)
Dept: REHABILITATION | Facility: HOSPITAL | Age: 52
End: 2022-05-26
Attending: NURSE PRACTITIONER
Payer: MEDICAID

## 2022-05-26 DIAGNOSIS — M25.612 DECREASED ROM OF LEFT SHOULDER: Primary | ICD-10-CM

## 2022-05-26 DIAGNOSIS — Z98.890 S/P LEFT ROTATOR CUFF REPAIR: ICD-10-CM

## 2022-05-26 DIAGNOSIS — M25.512 LEFT SHOULDER PAIN, UNSPECIFIED CHRONICITY: ICD-10-CM

## 2022-05-26 PROCEDURE — 97112 NEUROMUSCULAR REEDUCATION: CPT | Mod: CQ

## 2022-05-26 PROCEDURE — 97530 THERAPEUTIC ACTIVITIES: CPT | Mod: CQ

## 2022-05-26 PROCEDURE — 97140 MANUAL THERAPY 1/> REGIONS: CPT | Mod: CQ

## 2022-05-26 NOTE — PROGRESS NOTES
[facility]   Physical Therapy Treatment Note     Name: Javad Garcia  Clinic Number: [id number]    Therapy Diagnosis:   Encounter Diagnoses   Name Primary?    Decreased ROM of left shoulder Yes    S/P left rotator cuff repair     Left shoulder pain, unspecified chronicity      Physician: Feroz Encinas FNP    Visit Date: 5/26/2022    Physician Orders: PT Eval and Treat   Medical Diagnosis from Referral: s/p left rotator cuff repair   Evaluation Date: 5/4/2022  Authorization Period Expiration: 7/29/2022   Plan of Care Expiration: 7/29/2022  Progress Note Due: 6/4/2022  Visit # / Visits authorized: 6/24    PTA Visit #: 2    Time In: 9:05 am  Time Out: 9:45 am   Total Billable Time: 40 minutes    SUBJECTIVE     Pt reports: he was sore after last treatment.  He was compliant with home exercise program.  Response to previous treatment: no complaint   Functional change: getting a little bit easier to move, able to sleep at night, driving    Pain: 6-7/10  Location: left shoulder      OBJECTIVE     Active range of motion left shoulder: flexion 125 degrees, 35 degrees external rotation in adduction, and internal rotation to PSIS  Passive range of motion left shoulder flexion 130 degrees with muscle guarding    Treatment     Javad received the treatments listed below:      therapeutic exercises to develop strength, ROM and flexibility for 8 minutes including:  UBE x 5 minutes   Cybex rows with 3 plates, close  3 x 10    manual therapy techniques: Joint mobilizations, Myofacial release and Soft tissue Mobilization were applied to the: left shoulder for 15 minutes, including:  Gentle passive stretching into flexion and external rotation   Grade 1-2 joint mobilizations to promote relaxation and pain relief  Myofascial release to subscapularis and upper trap    neuromuscular re-education activities to improve: Coordination and Posture for 9 minutes. The following activities were  included:  scapular proprioceptive neuromuscular facilitation x 30  Sidelying external rotation 2 x 10   Sidelying scaption 3 x 10  Scapular retraction with extension with blue theraband x 30    therapeutic activities to improve functional performance for 8 minutes, including:  Pulleys x 3 minutes   Supine cane flexion with 10 second hold x 10  Cane flexion on rail x 20        Patient Education and Home Exercises     Home Exercises Provided and Patient Education Provided     Education provided:   - review of home exercise program; encouragement to discharge sling at home    Written Home Exercises Provided: Patient instructed to cont prior HEP. Exercises were reviewed and Javad was able to demonstrate them prior to the end of the session.  Javad demonstrated good  understanding of the education provided. See EMR under Patient Instructions for exercises provided during therapy sessions    ASSESSMENT     Javad is a 52 y.o. male referred to outpatient Physical Therapy with a medical diagnosis of s/p left rotator cuff repair. Pt presents with typical postop symptoms of pain, decreased range of motion, decreased strength, poor scapular stability and very limited functional use of left upper extremity. 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 He is able to sleep in the bed propped on pillows. He is back driving some. He still requires assistance with dressing and showering. He was still in the sling upon arrival but instructed patient to begin weaning out of the sling at home and only wear it when he goes outside or out into the community.      Javad is tight and tender in parascapular muscles. He tolerates myofascial release but shakes while receiving it. Passively he has 140 degrees flexion with pain in end-range and 40 degrees external rotation in 45 degrees scaption. He has tightness around  anterior deltoid insertion. Will progress as tolerated.     Javad Is progressing towards his goals.   Pt prognosis is Good.     Pt will continue to benefit from skilled outpatient physical therapy to address the deficits listed in the problem list box on initial evaluation, provide pt/family education and to maximize pt's level of independence in the home and community environment.     Pt's spiritual, cultural and educational needs considered and pt agreeable to plan of care and goals.     Anticipated barriers to physical therapy: none    Goals:   Short Term Goals: 6 weeks  1. Patient will be independent with home exercise program.  2. Patient will have passive range of motion as follows - 130 degrees flexion, 45 degrees external rotation and internal rotation.  3. Patient will be independent with dressing and driving.  4. Patient will be able to sleep all night in bed.     Long Term Goals: 12 weeks  1. Patient will have active range of motion as follow - 140 degrees flexion, 60 degrees external rotation and functional internal rotation to T12 for reaching overhead, behind head and behind back for dressing, grooming and hygiene.  2. Patient will have 4+/5 strength left shoulder/upper extremity to perform household chores and work related tasks.  3. Patient will place 3# dumbbell on head height shelf without hiking or pain left shoulder.  4. Patient will return to performing all household and outdoor chores.      PLAN     Continue Plan of Care for Outpatient Physical Therapy 2 times weekly for 12 weeks to include the following interventions: Electrical Stimulation premod/IFC/hivolt as needed, Manual Therapy, Moist Heat/ Ice, Neuromuscular Re-ed, Patient Education, Therapeutic Activities, Therapeutic Exercise and Ultrasound.        Kaylee Albert, PTA

## 2022-05-31 ENCOUNTER — CLINICAL SUPPORT (OUTPATIENT)
Dept: REHABILITATION | Facility: HOSPITAL | Age: 52
End: 2022-05-31
Attending: NURSE PRACTITIONER
Payer: MEDICAID

## 2022-05-31 DIAGNOSIS — G89.29 CHRONIC LEFT SHOULDER PAIN: ICD-10-CM

## 2022-05-31 DIAGNOSIS — M25.512 CHRONIC LEFT SHOULDER PAIN: ICD-10-CM

## 2022-05-31 DIAGNOSIS — M25.612 DECREASED ROM OF LEFT SHOULDER: Primary | ICD-10-CM

## 2022-05-31 DIAGNOSIS — Z98.890 S/P LEFT ROTATOR CUFF REPAIR: ICD-10-CM

## 2022-05-31 PROCEDURE — 97110 THERAPEUTIC EXERCISES: CPT | Mod: CQ

## 2022-05-31 PROCEDURE — 97112 NEUROMUSCULAR REEDUCATION: CPT | Mod: CQ

## 2022-05-31 PROCEDURE — 97140 MANUAL THERAPY 1/> REGIONS: CPT | Mod: CQ

## 2022-05-31 NOTE — PROGRESS NOTES
[facility]   Physical Therapy Treatment Note     Name: Javad Garcia  Clinic Number: [id number]    Therapy Diagnosis:   Encounter Diagnoses   Name Primary?    Decreased ROM of left shoulder Yes    S/P left rotator cuff repair     Chronic left shoulder pain      Physician: Feroz Encinas FNP    Visit Date: 5/31/2022    Physician Orders: PT Eval and Treat   Medical Diagnosis from Referral: s/p left rotator cuff repair   Evaluation Date: 5/4/2022  Authorization Period Expiration: 7/29/2022   Plan of Care Expiration: 7/29/2022  Progress Note Due: 6/4/2022  Visit # / Visits authorized: 7/24    PTA Visit #: 3    Time In: 1045  Time Out: 1125   Total Billable Time: 40 minutes    SUBJECTIVE     Pt reports: he was sore after last treatment, hurting some in top of shoulder  He was compliant with home exercise program.  Response to previous treatment: no complaint   Functional change: getting a little bit easier to move, able to sleep at night, driving    Pain: 6-7/10  Location: left shoulder      OBJECTIVE     Active range of motion left shoulder: flexion 132 degrees, external rotation functionally T1, and internal rotation to L5  Passive range of motion left shoulder flexion 130 degrees with muscle guarding    Treatment     Javad received the treatments listed below:      therapeutic exercises to develop strength, ROM and flexibility for 8 minutes including:  UBE x 5 minutes   Cybex rows with 4 plates, close  2 x 10    manual therapy techniques: Joint mobilizations, Myofacial release and Soft tissue Mobilization were applied to the: left shoulder for 15 minutes, including:  Gentle passive stretching into flexion and external rotation   Grade 1-2 joint mobilizations to promote relaxation and pain relief  Myofascial release to subscapularis and upper trap    neuromuscular re-education activities to improve: Coordination and Posture for 9 minutes. The following activities were  included:  scapular proprioceptive neuromuscular facilitation x 30  Sidelying external rotation 2 x 10   Sidelying scaption 3 x 10  Scapular retraction with extension with blue theraband x 30    therapeutic activities to improve functional performance for 5 minutes, including:  Pulleys x 0 minutes   Supine cane flexion with 10 second hold x 10  Cane flexion on rail x 20        Patient Education and Home Exercises     Home Exercises Provided and Patient Education Provided     Education provided:   - review of home exercise program; encouragement to discharge sling at home    Written Home Exercises Provided: Patient instructed to cont prior HEP. Exercises were reviewed and Javad was able to demonstrate them prior to the end of the session.  Javad demonstrated good  understanding of the education provided. See EMR under Patient Instructions for exercises provided during therapy sessions    ASSESSMENT   Case conference with Miriam Hunter PT for initial PTA visit. Still has spasmed areas in deltoid.  Javad is a 52 y.o. male referred to outpatient Physical Therapy with a medical diagnosis of s/p left rotator cuff repair. Pt presents with typical postop symptoms of pain, decreased range of motion, decreased strength, poor scapular stability and very limited functional use of left upper extremity. 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 He is able to sleep in the bed propped on pillows. He is back driving some. He still requires assistance with dressing and showering. He was still in the sling upon arrival but instructed patient to begin weaning out of the sling at home and only wear it when he goes outside or out into the community.      Javad is tight and somewhat guarded with manual therapy.     Javad Is progressing towards his goals.   Pt prognosis is Good.     Pt will continue to benefit from  skilled outpatient physical therapy to address the deficits listed in the problem list box on initial evaluation, provide pt/family education and to maximize pt's level of independence in the home and community environment.     Pt's spiritual, cultural and educational needs considered and pt agreeable to plan of care and goals.     Anticipated barriers to physical therapy: none    Goals:   Short Term Goals: 6 weeks  1. Patient will be independent with home exercise program.  2. Patient will have passive range of motion as follows - 130 degrees flexion, 45 degrees external rotation and internal rotation.  3. Patient will be independent with dressing and driving.  4. Patient will be able to sleep all night in bed.     Long Term Goals: 12 weeks  1. Patient will have active range of motion as follow - 140 degrees flexion, 60 degrees external rotation and functional internal rotation to T12 for reaching overhead, behind head and behind back for dressing, grooming and hygiene.  2. Patient will have 4+/5 strength left shoulder/upper extremity to perform household chores and work related tasks.  3. Patient will place 3# dumbbell on head height shelf without hiking or pain left shoulder.  4. Patient will return to performing all household and outdoor chores.      PLAN     Continue Plan of Care for Outpatient Physical Therapy 2 times weekly for 12 weeks to include the following interventions: Electrical Stimulation premod/IFC/hivolt as needed, Manual Therapy, Moist Heat/ Ice, Neuromuscular Re-ed, Patient Education, Therapeutic Activities, Therapeutic Exercise and Ultrasound.        Georgia Quiroz, PTA

## 2022-06-07 ENCOUNTER — CLINICAL SUPPORT (OUTPATIENT)
Dept: REHABILITATION | Facility: HOSPITAL | Age: 52
End: 2022-06-07
Attending: NURSE PRACTITIONER
Payer: MEDICAID

## 2022-06-07 DIAGNOSIS — M25.512 CHRONIC LEFT SHOULDER PAIN: ICD-10-CM

## 2022-06-07 DIAGNOSIS — M25.612 DECREASED ROM OF LEFT SHOULDER: Primary | ICD-10-CM

## 2022-06-07 DIAGNOSIS — Z98.890 S/P LEFT ROTATOR CUFF REPAIR: ICD-10-CM

## 2022-06-07 DIAGNOSIS — G89.29 CHRONIC LEFT SHOULDER PAIN: ICD-10-CM

## 2022-06-07 PROCEDURE — 97140 MANUAL THERAPY 1/> REGIONS: CPT

## 2022-06-07 PROCEDURE — 97112 NEUROMUSCULAR REEDUCATION: CPT

## 2022-06-07 PROCEDURE — 97530 THERAPEUTIC ACTIVITIES: CPT

## 2022-06-07 PROCEDURE — 97110 THERAPEUTIC EXERCISES: CPT

## 2022-06-07 NOTE — PROGRESS NOTES
[facility]   Physical Therapy Progress Note     Name: Javad Garcia  Clinic Number: [id number]    Therapy Diagnosis:   Encounter Diagnoses   Name Primary?    Decreased ROM of left shoulder Yes    S/P left rotator cuff repair     Chronic left shoulder pain      Physician: Feroz Encinas FNP    Visit Date: 6/7/2022    Physician Orders: PT Eval and Treat   Medical Diagnosis from Referral: s/p left rotator cuff repair   Evaluation Date: 5/4/2022  Authorization Period Expiration: 7/29/2022   Plan of Care Expiration: 7/29/2022  Progress Note Due: 7/7/2022  Visit # / Visits authorized: 8/24    PTA Visit #:     Time In: 1000 am  Time Out: 1053 am   Total Billable Time: 53 minutes    SUBJECTIVE     Pt reports: he is doing ok  He was compliant with home exercise program.  Response to previous treatment: no complaint   Functional change: getting a little bit easier to move, able to sleep at night, driving    Pain: 5/10  Location: left shoulder      OBJECTIVE     Active range of motion left shoulder: flexion 136 degrees, external rotation in adduction 45 degrees, external rotation functionally C2, and internal rotation to PSIS with assist of other hand  Passive range of motion left shoulder flexion 145 degrees, external rotation and it to 45-50 degrees - all in supine    Treatment     Javad received the treatments listed below:      therapeutic exercises to develop strength, ROM and flexibility for 11 minutes including:  UBE x 5 minutes   Cybex rows with 4 plates, 3 way x 10 each  Sleeper stretch taught for home exercise program     manual therapy techniques: Joint mobilizations, Myofacial release and Soft tissue Mobilization were applied to the: left shoulder for 10 minutes, including:  Gentle passive stretching into flexion and external rotation   Grade 1-2 joint mobilizations to promote relaxation and pain relief  Myofascial release to subscapularis and upper trap    neuromuscular re-education  activities to improve: Coordination and Posture for 16 minutes. The following activities were included:  scapular proprioceptive neuromuscular facilitation --  Sidelying external rotation 2# 3 x 10   Sidelying scaption --  Scapular retraction with extension with blue theraband x 30  Bilateral horizontal abduction yellow x 15  Unilateral external rotation red 2 x 10    therapeutic activities to improve functional performance for 16 minutes, including:  Pulleys x 0 minutes   Supine cane flexion with 10 second hold x 10  Cane flexion on wall x 20  Internal rotation with cane behind back x 15  Active cane flexion (back against wall) x 15      Patient Education and Home Exercises     Home Exercises Provided and Patient Education Provided     Education provided:   - review of home exercise program; encouragement to discharge sling at home    Written Home Exercises Provided: Patient instructed to cont prior HEP. Exercises were reviewed and Javad was able to demonstrate them prior to the end of the session.  Javad demonstrated good  understanding of the education provided. See EMR under Patient Instructions for exercises provided during therapy sessions    ASSESSMENT     Javad is a 52 y.o. male referred to outpatient Physical Therapy with a medical diagnosis of s/p left rotator cuff repair. Pt presents with typical postop symptoms of pain, decreased range of motion, decreased strength, poor scapular stability and very limited functional use of left upper extremity. 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 He is able to sleep in the bed propped on pillows. He is back driving some. He still requires assistance with dressing and showering. He was still in the sling upon arrival but instructed patient to begin weaning out of the sling at home and only wear it when he goes outside or out into the  community.      Javad is now 11 weeks postop left rotator cuff repair. Javad is progressing well in therapy but is still complaining of pain each visit. He is most limited in internal rotation so sleeper stretch and towel internal rotation stretch were added today to his updated home exercise program. His active flexion range of motion is improving each week. We are continuing to work on scapular stabilization as well as beginning some rotator cuff strengthening and working on active flexion.    Javad Is progressing towards his goals.   Pt prognosis is Good.     Pt will continue to benefit from skilled outpatient physical therapy to address the deficits listed in the problem list box on initial evaluation, provide pt/family education and to maximize pt's level of independence in the home and community environment.     Pt's spiritual, cultural and educational needs considered and pt agreeable to plan of care and goals.     Anticipated barriers to physical therapy: none    Goals:   Short Term Goals: 6 weeks  1. Patient will be independent with home exercise program. MET  2. Patient will have passive range of motion as follows - 130 degrees flexion, 45 degrees external rotation and internal rotation. MET  3. Patient will be independent with dressing and driving. MET  4. Patient will be able to sleep all night in bed. IMPROVING - he says his shoulder still wakes him at times     Long Term Goals: 12 weeks  1. Patient will have active range of motion as follow - 140 degrees flexion, 60 degrees external rotation and functional internal rotation to T12 for reaching overhead, behind head and behind back for dressing, grooming and hygiene.  IMPROVING - flexion 136 degrees, external rotation in adduction 45 degrees, external rotation functionally C2, and internal rotation to PSIS with assist of other hand  2. Patient will have 4+/5 strength left shoulder/upper extremity to perform household chores and work related tasks. NOT  MET  3. Patient will place 3# dumbbell on head height shelf without hiking or pain left shoulder. NOT MET  4. Patient will return to performing all household and outdoor chores. NOT MET      PLAN     Continue Plan of Care for Outpatient Physical Therapy 2 times weekly for 12 weeks to include the following interventions: Electrical Stimulation premod/IFC/hivolt as needed, Manual Therapy, Moist Heat/ Ice, Neuromuscular Re-ed, Patient Education, Therapeutic Activities, Therapeutic Exercise and Ultrasound.        MURIEL SHAH, PT

## 2022-06-09 ENCOUNTER — CLINICAL SUPPORT (OUTPATIENT)
Dept: REHABILITATION | Facility: HOSPITAL | Age: 52
End: 2022-06-09
Attending: NURSE PRACTITIONER
Payer: MEDICAID

## 2022-06-09 DIAGNOSIS — Z98.890 S/P LEFT ROTATOR CUFF REPAIR: ICD-10-CM

## 2022-06-09 DIAGNOSIS — M25.612 DECREASED ROM OF LEFT SHOULDER: Primary | ICD-10-CM

## 2022-06-09 DIAGNOSIS — M25.512 LEFT SHOULDER PAIN, UNSPECIFIED CHRONICITY: ICD-10-CM

## 2022-06-09 PROCEDURE — 97530 THERAPEUTIC ACTIVITIES: CPT | Mod: CQ

## 2022-06-09 PROCEDURE — 97140 MANUAL THERAPY 1/> REGIONS: CPT | Mod: CQ

## 2022-06-09 PROCEDURE — 97112 NEUROMUSCULAR REEDUCATION: CPT | Mod: CQ

## 2022-06-09 NOTE — PROGRESS NOTES
[facility]   Physical Therapy Progress Note     Name: Javad Garcia  Clinic Number: [id number]    Therapy Diagnosis:   No diagnosis found.  Physician: Feroz Encinas FNP    Visit Date: 6/9/2022    Physician Orders: PT Eval and Treat   Medical Diagnosis from Referral: s/p left rotator cuff repair   Evaluation Date: 5/4/2022  Authorization Period Expiration: 7/29/2022   Plan of Care Expiration: 7/29/2022  Progress Note Due: 7/7/2022  Visit # / Visits authorized: 9/24    PTA Visit #: 1    Time In: 10:00 am  Time Out: 10:40 am   Total Billable Time: 40 minutes    SUBJECTIVE     Pt reports: he is sore from new stretches.  He was compliant with home exercise program.  Response to previous treatment: no complaint   Functional change: getting a little bit easier to move, able to sleep at night, driving    Pain: 7/10 more soreness due to new home exercise program   Location: left shoulder      OBJECTIVE     Active range of motion left shoulder: flexion 136 degrees, external rotation in adduction 45 degrees, external rotation functionally C2, and internal rotation to PSIS with assist of other hand  Passive range of motion left shoulder flexion 145 degrees, external rotation and it to 45-50 degrees - all in supine    Treatment     Javad received the treatments listed below:      therapeutic exercises to develop strength, ROM and flexibility for 7 minutes including:  UBE x 5 minutes   Cybex rows with 4 plates, 3 way x 10 each    manual therapy techniques: Joint mobilizations, Myofacial release and Soft tissue Mobilization were applied to the: left shoulder for 10 minutes, including:  Gentle passive stretching into flexion and external rotation   Grade 1-2 joint mobilizations to promote relaxation and pain relief  Myofascial release to subscapularis and upper trap    neuromuscular re-education activities to improve: Coordination and Posture for 15 minutes. The following activities were  included:  scapular proprioceptive neuromuscular facilitation --  Sidelying external rotation 3 x 10 with manual resist   Sidelying scaption x 30  Scapular retraction with extension with blue theraband x 30  Bilateral horizontal abduction yellow x 20  Unilateral external rotation red 3 x 10    therapeutic activities to improve functional performance for 8 minutes, including:  Supine cane flexion with 10 second hold x 10  Cane flexion on wall x 10 with liftoff  Internal rotation with cane behind back 10 x 10 second hold   Active cane flexion (back against wall) x 20      Patient Education and Home Exercises     Home Exercises Provided and Patient Education Provided     Education provided:   - review of home exercise program; encouragement to discharge sling at home    Written Home Exercises Provided: Patient instructed to cont prior HEP. Exercises were reviewed and Javad was able to demonstrate them prior to the end of the session.  Javad demonstrated good  understanding of the education provided. See EMR under Patient Instructions for exercises provided during therapy sessions    ASSESSMENT     Javad is a 52 y.o. male referred to outpatient Physical Therapy with a medical diagnosis of s/p left rotator cuff repair. Pt presents with typical postop symptoms of pain, decreased range of motion, decreased strength, poor scapular stability and very limited functional use of left upper extremity. 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 He is able to sleep in the bed propped on pillows. He is back driving some. He still requires assistance with dressing and showering. He was still in the sling upon arrival but instructed patient to begin weaning out of the sling at home and only wear it when he goes outside or out into the community.      Javad is now 11 weeks postop left rotator cuff repair. Javad is  progressing well in therapy but continues to complain of pain each visit, mostly soreness from the new stretches today. He received some scapular mobilizations with manual today and was able to reach S1 by end of treatment. We are continuing to work on scapular stabilization as well as beginning some rotator cuff strengthening and working on active flexion.    Javad Is progressing towards his goals.   Pt prognosis is Good.     Pt will continue to benefit from skilled outpatient physical therapy to address the deficits listed in the problem list box on initial evaluation, provide pt/family education and to maximize pt's level of independence in the home and community environment.     Pt's spiritual, cultural and educational needs considered and pt agreeable to plan of care and goals.     Anticipated barriers to physical therapy: none    Goals:   Short Term Goals: 6 weeks  1. Patient will be independent with home exercise program. MET  2. Patient will have passive range of motion as follows - 130 degrees flexion, 45 degrees external rotation and internal rotation. MET  3. Patient will be independent with dressing and driving. MET  4. Patient will be able to sleep all night in bed. IMPROVING - he says his shoulder still wakes him at times     Long Term Goals: 12 weeks  1. Patient will have active range of motion as follow - 140 degrees flexion, 60 degrees external rotation and functional internal rotation to T12 for reaching overhead, behind head and behind back for dressing, grooming and hygiene.  IMPROVING - flexion 136 degrees, external rotation in adduction 45 degrees, external rotation functionally C2, and internal rotation to PSIS with assist of other hand  2. Patient will have 4+/5 strength left shoulder/upper extremity to perform household chores and work related tasks. NOT MET  3. Patient will place 3# dumbbell on head height shelf without hiking or pain left shoulder. NOT MET  4. Patient will return to  performing all household and outdoor chores. NOT MET      PLAN     Continue Plan of Care for Outpatient Physical Therapy 2 times weekly for 12 weeks to include the following interventions: Electrical Stimulation premod/IFC/hivolt as needed, Manual Therapy, Moist Heat/ Ice, Neuromuscular Re-ed, Patient Education, Therapeutic Activities, Therapeutic Exercise and Ultrasound.        Kaylee Albert, PTA

## 2022-06-14 ENCOUNTER — CLINICAL SUPPORT (OUTPATIENT)
Dept: REHABILITATION | Facility: HOSPITAL | Age: 52
End: 2022-06-14
Attending: NURSE PRACTITIONER
Payer: MEDICAID

## 2022-06-14 DIAGNOSIS — Z98.890 S/P LEFT ROTATOR CUFF REPAIR: ICD-10-CM

## 2022-06-14 DIAGNOSIS — M25.612 DECREASED ROM OF LEFT SHOULDER: Primary | ICD-10-CM

## 2022-06-14 DIAGNOSIS — M25.512 LEFT SHOULDER PAIN, UNSPECIFIED CHRONICITY: ICD-10-CM

## 2022-06-14 PROCEDURE — 97112 NEUROMUSCULAR REEDUCATION: CPT | Mod: CQ

## 2022-06-14 PROCEDURE — 97140 MANUAL THERAPY 1/> REGIONS: CPT | Mod: CQ

## 2022-06-14 PROCEDURE — 97530 THERAPEUTIC ACTIVITIES: CPT | Mod: CQ

## 2022-06-14 NOTE — PROGRESS NOTES
[facility]   Physical Therapy Progress Note     Name: Javad Garcia  Clinic Number: [id number]    Therapy Diagnosis:   Encounter Diagnoses   Name Primary?    Decreased ROM of left shoulder Yes    S/P left rotator cuff repair     Left shoulder pain, unspecified chronicity      Physician: Feroz Encinas FNP    Visit Date: 6/14/2022    Physician Orders: PT Eval and Treat   Medical Diagnosis from Referral: s/p left rotator cuff repair   Evaluation Date: 5/4/2022  Authorization Period Expiration: 7/29/2022   Plan of Care Expiration: 7/29/2022  Progress Note Due: 7/7/2022  Visit # / Visits authorized: 10/24    PTA Visit #: 2    Time In: 10:00 am  Time Out: 10:39 am   Total Billable Time: 39 minutes    SUBJECTIVE     Pt reports: he feels alright. He can put his hand a little farther behind his back.  He was compliant with home exercise program.  Response to previous treatment: no complaint   Functional change: getting a little bit easier to move, able to sleep at night, driving    Pain: 6/10    Location: left shoulder      OBJECTIVE     Active range of motion left shoulder: flexion 136 degrees, external rotation in adduction 45 degrees, external rotation functionally C2, and internal rotation to PSIS with assist of other hand  Passive range of motion left shoulder flexion 145 degrees, external rotation and it to 45-50 degrees - all in supine    Treatment     Javad received the treatments listed below:      therapeutic exercises to develop strength, ROM and flexibility for 8 minutes including:  UBE x 5 minutes   Cybex rows with 4 plates, 3 way x 15 each    manual therapy techniques: Joint mobilizations, Myofacial release and Soft tissue Mobilization were applied to the: left shoulder for 10 minutes, including:  Gentle passive stretching into flexion and external rotation   Grade 1-2 joint mobilizations to promote relaxation and pain relief  Myofascial release to subscapularis and upper  trap    neuromuscular re-education activities to improve: Coordination and Posture for 12 minutes. The following activities were included:  scapular proprioceptive neuromuscular facilitation x 30  Sidelying external rotation 3 x 10 with 1 lb  Sidelying scaption x 30  Scapular retraction with extension with blue theraband x 10 with 10 second hold   Bilateral horizontal abduction red x 20 in front of mirror  Unilateral external rotation red 3 x 10    therapeutic activities to improve functional performance for 8 minutes, including:  Supine cane flexion with with 2 lbs 10 second hold x 10  Cane flexion on wall x 10 with liftoff  Internal rotation with cane behind back 10 x 10 second hold   Active cane flexion (back against wall) x 20 with 2 lbs      Patient Education and Home Exercises     Home Exercises Provided and Patient Education Provided     Education provided:   - review of home exercise program; encouragement to discharge sling at home    Written Home Exercises Provided: Patient instructed to cont prior HEP. Exercises were reviewed and Javad was able to demonstrate them prior to the end of the session.  Javad demonstrated good  understanding of the education provided. See EMR under Patient Instructions for exercises provided during therapy sessions    ASSESSMENT     Javad is a 52 y.o. male referred to outpatient Physical Therapy with a medical diagnosis of s/p left rotator cuff repair. Pt presents with typical postop symptoms of pain, decreased range of motion, decreased strength, poor scapular stability and very limited functional use of left upper extremity. 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 He is able to sleep in the bed propped on pillows. He is back driving some. He still requires assistance with dressing and showering. He was still in the sling upon arrival but instructed  patient to begin weaning out of the sling at home and only wear it when he goes outside or out into the community.      Javad is now 12 weeks postop left rotator cuff repair. Javad is progressing well in therapy but continues to complain of pain each visit. He is now able to reach L4 actively with internal rotation but continues to kick into abduction and shoulder hiking with end-range active flexion. He performed several exercises in front of mirror to help with scapular awareness during active range of motion. We are continuing to work on scapular stabilization as well as beginning some rotator cuff strengthening and working on active flexion.    Javad Is progressing towards his goals.   Pt prognosis is Good.     Pt will continue to benefit from skilled outpatient physical therapy to address the deficits listed in the problem list box on initial evaluation, provide pt/family education and to maximize pt's level of independence in the home and community environment.     Pt's spiritual, cultural and educational needs considered and pt agreeable to plan of care and goals.     Anticipated barriers to physical therapy: none    Goals:   Short Term Goals: 6 weeks  1. Patient will be independent with home exercise program. MET  2. Patient will have passive range of motion as follows - 130 degrees flexion, 45 degrees external rotation and internal rotation. MET  3. Patient will be independent with dressing and driving. MET  4. Patient will be able to sleep all night in bed. IMPROVING - he says his shoulder still wakes him at times     Long Term Goals: 12 weeks  1. Patient will have active range of motion as follow - 140 degrees flexion, 60 degrees external rotation and functional internal rotation to T12 for reaching overhead, behind head and behind back for dressing, grooming and hygiene.  IMPROVING - flexion 136 degrees, external rotation in adduction 45 degrees, external rotation functionally C2, and internal rotation to  PSIS with assist of other hand  2. Patient will have 4+/5 strength left shoulder/upper extremity to perform household chores and work related tasks. NOT MET  3. Patient will place 3# dumbbell on head height shelf without hiking or pain left shoulder. NOT MET  4. Patient will return to performing all household and outdoor chores. NOT MET      PLAN     Continue Plan of Care for Outpatient Physical Therapy 2 times weekly for 12 weeks to include the following interventions: Electrical Stimulation premod/IFC/hivolt as needed, Manual Therapy, Moist Heat/ Ice, Neuromuscular Re-ed, Patient Education, Therapeutic Activities, Therapeutic Exercise and Ultrasound.        Kaylee Albert, PTA

## 2022-06-16 ENCOUNTER — CLINICAL SUPPORT (OUTPATIENT)
Dept: REHABILITATION | Facility: HOSPITAL | Age: 52
End: 2022-06-16
Attending: NURSE PRACTITIONER
Payer: MEDICAID

## 2022-06-16 DIAGNOSIS — M25.512 ACUTE PAIN OF LEFT SHOULDER: ICD-10-CM

## 2022-06-16 DIAGNOSIS — Z98.890 S/P LEFT ROTATOR CUFF REPAIR: ICD-10-CM

## 2022-06-16 DIAGNOSIS — M25.612 DECREASED ROM OF LEFT SHOULDER: Primary | ICD-10-CM

## 2022-06-16 PROCEDURE — 97112 NEUROMUSCULAR REEDUCATION: CPT | Mod: CQ

## 2022-06-16 PROCEDURE — 97140 MANUAL THERAPY 1/> REGIONS: CPT | Mod: CQ

## 2022-06-16 NOTE — PROGRESS NOTES
[facility]   Physical Therapy Progress Note     Name: Javad Garcia  Clinic Number: [id number]    Therapy Diagnosis:   Encounter Diagnoses   Name Primary?    Decreased ROM of left shoulder Yes    S/P left rotator cuff repair     Acute pain of left shoulder      Physician: Feroz Encinas FNP    Visit Date: 6/16/2022    Physician Orders: PT Eval and Treat   Medical Diagnosis from Referral: s/p left rotator cuff repair   Evaluation Date: 5/4/2022  Authorization Period Expiration: 7/29/2022   Plan of Care Expiration: 7/29/2022  Progress Note Due: 7/7/2022  Visit # / Visits authorized: 11/24    PTA Visit #: 3    Time In: 10:09 am  Time Out: 10:40 am   Total Billable Time: 31 minutes    SUBJECTIVE     Pt reports: doing ok, shoulder still hurts, still most difficult to reach behind  He was compliant with home exercise program.  Response to previous treatment: no complaint   Functional change: getting a little bit easier to move, able to sleep at night, driving    Pain: 6/10 with pain meds   Location: left shoulder      OBJECTIVE     Active range of motion left shoulder: flexion 136 degrees, external rotation in adduction 45 degrees, external rotation functionally C2, and internal rotation to PSIS with assist of other hand  Passive range of motion left shoulder flexion 145 degrees, external rotation and it to 45-50 degrees - all in supine    Treatment     Javad received the treatments listed below:      therapeutic exercises to develop strength, ROM and flexibility for 8 minutes including:  UBE x 5 minutes    rows with blue tband x 30  Shoulder extension blue x 30  Blue band tricep x 20  Reverse  seated flexion extension on cable #3 x 15 with 5 sec hold    manual therapy techniques: Joint mobilizations, Myofacial release and Soft tissue Mobilization were applied to the: left shoulder for 15 minutes, including:  Gentle passive stretching into flexion and external rotation   Grade 1-2 joint  mobilizations to promote relaxation and pain relief  Myofascial release to subscapularis and upper trap    neuromuscular re-education activities to improve: Coordination and Posture for 8 minutes. The following activities were included:  scapular proprioceptive neuromuscular facilitation x 30  Sidelying external rotation 3 x 10 with MRE  Sidelying scaption x 20    Bilateral horizontal abduction red x 20 in front of mirror NOT THIS VISIT   Unilateral external rotation red 3 x 10 NOT THIS VISIT     therapeutic activities to improve functional performance for 0 minutes, including:  Supine cane flexion with with 2 lbs 10 second hold x 10 NOT THIS VISIT   Cane flexion on wall x 10 with liftoff NOT THIS VISIT   Internal rotation with cane behind back 10 x 10 second hold NOT THIS VISIT   Active cane flexion (back against wall) x 20 with 2 lbs NOT THIS VISIT       Patient Education and Home Exercises     Home Exercises Provided and Patient Education Provided     Education provided:   - blue band: rows, extension, tricep    Written Home Exercises Provided: Patient instructed to cont prior HEP. Exercises were reviewed and Javad was able to demonstrate them prior to the end of the session.  Javad demonstrated good  understanding of the education provided. See EMR under Patient Instructions for exercises provided during therapy sessions    ASSESSMENT   Case conference with Miriam Hunter PT for initial PTA visit. Pt with most limitation in INTERNAL ROTATION ROM. Min discomfort with top range flexion. Weakness of RUE/ hand noteable with therband ex.     Javad is a 52 y.o. male referred to outpatient Physical Therapy with a medical diagnosis of s/p left rotator cuff repair. Pt presents with typical postop symptoms of pain, decreased range of motion, decreased strength, poor scapular stability and very limited functional use of left upper extremity. 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 He is able to sleep in the bed propped on pillows. He is back driving some. He still requires assistance with dressing and showering. He was still in the sling upon arrival but instructed patient to begin weaning out of the sling at home and only wear it when he goes outside or out into the community.      Javad is now 12 weeks postop left rotator cuff repair. Javad is progressing well in therapy but continues to complain of pain each visit. He is now able to reach L4 actively with internal rotation but continues to kick into abduction and shoulder hiking with end-range active flexion. He performed several exercises in front of mirror to help with scapular awareness during active range of motion. We are continuing to work on scapular stabilization as well as beginning some rotator cuff strengthening and working on active flexion.    Javad Is progressing towards his goals.   Pt prognosis is Good.     Pt will continue to benefit from skilled outpatient physical therapy to address the deficits listed in the problem list box on initial evaluation, provide pt/family education and to maximize pt's level of independence in the home and community environment.     Pt's spiritual, cultural and educational needs considered and pt agreeable to plan of care and goals.     Anticipated barriers to physical therapy: none    Goals:   Short Term Goals: 6 weeks  1. Patient will be independent with home exercise program. MET  2. Patient will have passive range of motion as follows - 130 degrees flexion, 45 degrees external rotation and internal rotation. MET  3. Patient will be independent with dressing and driving. MET  4. Patient will be able to sleep all night in bed. IMPROVING - he says his shoulder still wakes him at times     Long Term Goals: 12 weeks  1. Patient will have active range of motion as follow - 140 degrees flexion, 60 degrees external  rotation and functional internal rotation to T12 for reaching overhead, behind head and behind back for dressing, grooming and hygiene.  IMPROVING - flexion 136 degrees, external rotation in adduction 45 degrees, external rotation functionally C2, and internal rotation to PSIS with assist of other hand  2. Patient will have 4+/5 strength left shoulder/upper extremity to perform household chores and work related tasks. NOT MET  3. Patient will place 3# dumbbell on head height shelf without hiking or pain left shoulder. NOT MET  4. Patient will return to performing all household and outdoor chores. NOT MET      PLAN     Continue Plan of Care for Outpatient Physical Therapy 2 times weekly for 12 weeks to include the following interventions: Electrical Stimulation premod/IFC/hivolt as needed, Manual Therapy, Moist Heat/ Ice, Neuromuscular Re-ed, Patient Education, Therapeutic Activities, Therapeutic Exercise and Ultrasound.        Georgia Quiroz, PTA

## 2022-06-21 ENCOUNTER — CLINICAL SUPPORT (OUTPATIENT)
Dept: REHABILITATION | Facility: HOSPITAL | Age: 52
End: 2022-06-21
Attending: NURSE PRACTITIONER
Payer: MEDICAID

## 2022-06-21 DIAGNOSIS — M25.612 DECREASED ROM OF LEFT SHOULDER: Primary | ICD-10-CM

## 2022-06-21 DIAGNOSIS — M25.512 CHRONIC LEFT SHOULDER PAIN: ICD-10-CM

## 2022-06-21 DIAGNOSIS — G89.29 CHRONIC LEFT SHOULDER PAIN: ICD-10-CM

## 2022-06-21 DIAGNOSIS — Z98.890 S/P LEFT ROTATOR CUFF REPAIR: ICD-10-CM

## 2022-06-21 PROCEDURE — 97110 THERAPEUTIC EXERCISES: CPT

## 2022-06-21 PROCEDURE — 97112 NEUROMUSCULAR REEDUCATION: CPT

## 2022-06-21 NOTE — PROGRESS NOTES
[facility]   Physical Therapy Treatment Note     Name: Javad Garcia  Clinic Number: [id number]    Therapy Diagnosis:   Encounter Diagnoses   Name Primary?    Decreased ROM of left shoulder Yes    S/P left rotator cuff repair     Chronic left shoulder pain      Physician: Feroz Encinas FNP    Visit Date: 6/21/2022    Physician Orders: PT Eval and Treat   Medical Diagnosis from Referral: s/p left rotator cuff repair   Evaluation Date: 5/4/2022  Authorization Period Expiration: 7/29/2022   Plan of Care Expiration: 7/29/2022  Progress Note Due: 7/7/2022  Visit # / Visits authorized: 12/24    PTA Visit #:     Time In: 10:03 am  Time Out: 10:48 am   Total Billable Time: 39 minutes    SUBJECTIVE     Pt reports: shoulder feels a little tight and hurts a little on the top (points to supraspinatus insertion)  He was compliant with home exercise program.  Response to previous treatment: no complaint   Functional change: getting a little bit easier to move, able to sleep at night, driving    Pain: 6/10 with pain meds   Location: left shoulder      OBJECTIVE     Active range of motion left shoulder: flexion 136 degrees, external rotation in adduction 45 degrees, external rotation functionally C2, and internal rotation to PSIS with assist of other hand  Passive range of motion left shoulder flexion 145 degrees, external rotation and it to 45-50 degrees - all in supine    Treatment     Javad received the treatments listed below:      therapeutic exercises to develop strength, ROM and flexibility for 9 minutes including:  UBE x 5 minutes    rows with blue tband --  Shoulder extension blue 10 x 10 second hold   Blue band tricep --  Reverse  seated flexion extension on cable #3 --    manual therapy techniques: Joint mobilizations, Myofacial release and Soft tissue Mobilization were applied to the: left shoulder for 5 minutes, including:  (NOT ON 6/21)  Gentle passive stretching into flexion and  external rotation   Grade 1-2 joint mobilizations to promote relaxation and pain relief  Myofascial release to subscapularis and upper trap    (ON 6/21)  CFM to supraspinatus insertion x 5 minutes    neuromuscular re-education activities to improve: Coordination and Posture for 25 minutes. The following activities were included:  scapular proprioceptive neuromuscular facilitation --  Sidelying external rotation --  Sidelying scaption --  Cybex rows 3 way - 5 plates x 15 each  Bilateral horizontal abduction red x 20 in front of mirror  Unilateral external rotation red 3 x 10  Close  cables 2 plates x 20  Posterior deltoid flies 2# 2 x 10    therapeutic activities to improve functional performance for 0 minutes, including:  Supine cane flexion with with 2 lbs 10 second hold x 10 NOT THIS VISIT   Cane flexion on wall x 10 with liftoff NOT THIS VISIT   Internal rotation with cane behind back 10 x 10 second hold NOT THIS VISIT   Active cane flexion (back against wall) x 20 with 2 lbs NOT THIS VISIT     Ice massage x 5 minutes to right supraspinatus insertion at       Patient Education and Home Exercises     Home Exercises Provided and Patient Education Provided     Education provided:   - blue band: rows, extension, tricep    Written Home Exercises Provided: Patient instructed to cont prior HEP. Exercises were reviewed and Javad was able to demonstrate them prior to the end of the session.  Javad demonstrated good  understanding of the education provided. See EMR under Patient Instructions for exercises provided during therapy sessions    ASSESSMENT       Javad is a 52 y.o. male referred to outpatient Physical Therapy with a medical diagnosis of s/p left rotator cuff repair. Pt presents with typical postop symptoms of pain, decreased range of motion, decreased strength, poor scapular stability and very limited functional use of left upper extremity. 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 He is able to sleep in the bed propped on pillows. He is back driving some. He still requires assistance with dressing and showering. He was still in the sling upon arrival but instructed patient to begin weaning out of the sling at home and only wear it when he goes outside or out into the community.      Javad was complaining of some pain around the supraspinatus insertion. We decreased the intensity of his exercises today and performed some CFM to supraspinatus insertion. Ended treatment with ice massage.     Javad Is progressing towards his goals.   Pt prognosis is Good.     Pt will continue to benefit from skilled outpatient physical therapy to address the deficits listed in the problem list box on initial evaluation, provide pt/family education and to maximize pt's level of independence in the home and community environment.     Pt's spiritual, cultural and educational needs considered and pt agreeable to plan of care and goals.     Anticipated barriers to physical therapy: none    Goals:   Short Term Goals: 6 weeks  1. Patient will be independent with home exercise program. MET  2. Patient will have passive range of motion as follows - 130 degrees flexion, 45 degrees external rotation and internal rotation. MET  3. Patient will be independent with dressing and driving. MET  4. Patient will be able to sleep all night in bed. IMPROVING - he says his shoulder still wakes him at times     Long Term Goals: 12 weeks  1. Patient will have active range of motion as follow - 140 degrees flexion, 60 degrees external rotation and functional internal rotation to T12 for reaching overhead, behind head and behind back for dressing, grooming and hygiene.  IMPROVING - flexion 136 degrees, external rotation in adduction 45 degrees, external rotation functionally C2, and internal rotation to PSIS with assist of other  hand  2. Patient will have 4+/5 strength left shoulder/upper extremity to perform household chores and work related tasks. NOT MET  3. Patient will place 3# dumbbell on head height shelf without hiking or pain left shoulder. NOT MET  4. Patient will return to performing all household and outdoor chores. NOT MET      PLAN     Continue Plan of Care for Outpatient Physical Therapy 2 times weekly for 12 weeks to include the following interventions: Electrical Stimulation premod/IFC/hivolt as needed, Manual Therapy, Moist Heat/ Ice, Neuromuscular Re-ed, Patient Education, Therapeutic Activities, Therapeutic Exercise and Ultrasound.        MURIEL SHAH, PT

## 2022-06-23 ENCOUNTER — CLINICAL SUPPORT (OUTPATIENT)
Dept: REHABILITATION | Facility: HOSPITAL | Age: 52
End: 2022-06-23
Attending: NURSE PRACTITIONER
Payer: MEDICAID

## 2022-06-23 DIAGNOSIS — Z98.890 S/P LEFT ROTATOR CUFF REPAIR: ICD-10-CM

## 2022-06-23 DIAGNOSIS — M25.612 DECREASED ROM OF LEFT SHOULDER: Primary | ICD-10-CM

## 2022-06-23 DIAGNOSIS — M25.512 LEFT SHOULDER PAIN, UNSPECIFIED CHRONICITY: ICD-10-CM

## 2022-06-23 PROCEDURE — 97110 THERAPEUTIC EXERCISES: CPT | Mod: CQ

## 2022-06-23 PROCEDURE — 97112 NEUROMUSCULAR REEDUCATION: CPT | Mod: CQ

## 2022-06-23 PROCEDURE — 97140 MANUAL THERAPY 1/> REGIONS: CPT | Mod: CQ

## 2022-06-23 NOTE — PROGRESS NOTES
[facility]   Physical Therapy Treatment Note     Name: Javad Garcia  Clinic Number: [id number]    Therapy Diagnosis:   Encounter Diagnoses   Name Primary?    Decreased ROM of left shoulder Yes    S/P left rotator cuff repair     Left shoulder pain, unspecified chronicity      Physician: Feroz Encinas FNP    Visit Date: 6/23/2022    Physician Orders: PT Eval and Treat   Medical Diagnosis from Referral: s/p left rotator cuff repair   Evaluation Date: 5/4/2022  Authorization Period Expiration: 7/29/2022   Plan of Care Expiration: 7/29/2022  Progress Note Due: 7/7/2022  Visit # / Visits authorized: 13/24    PTA Visit #: 1    Time In: 9:55 am  Time Out: 10:38 am   Total Billable Time: 38 minutes    SUBJECTIVE     Pt reports: he is still hurting around supraspinatus insertion but not as bad as last visit.  He was compliant with home exercise program.  Response to previous treatment: no complaint   Functional change: getting a little bit easier to move, able to sleep at night, driving    Pain: 5/10 with pain meds   Location: left shoulder      OBJECTIVE     Active range of motion left shoulder: flexion 136 degrees, external rotation in adduction 45 degrees, external rotation functionally C2, and internal rotation to PSIS with assist of other hand  Passive range of motion left shoulder flexion 145 degrees, external rotation and it to 45-50 degrees - all in supine    Treatment     Javad received the treatments listed below:      therapeutic exercises to develop strength, ROM and flexibility for 8 minutes including:  UBE x 5 minutes   Scapular retraction with extension blue 3 x 10      manual therapy techniques: Joint mobilizations, Myofacial release and Soft tissue Mobilization were applied to the: left shoulder for 8 minutes, including:  (NOT ON 6/21)  Gentle passive stretching into flexion and external rotation   Grade 1-2 joint mobilizations to promote relaxation and pain relief  Myofascial  release to subscapularis and upper trap    (ON 6/23)  CFM to supraspinatus insertion x 8 minutes    neuromuscular re-education activities to improve: Coordination and Posture for 23 minutes. The following activities were included:  scapular proprioceptive neuromuscular facilitation --  Sidelying external rotation --  Sidelying scaption --  Cybex rows 3 way - 5 plates x 15 each  Bilateral horizontal abduction red x 20 in front of mirror  Unilateral external rotation red 3 x 10  Close  cables 2 plates x 20 with cues for scapular depression  Posterior deltoid flies 2 lbs 2 x 10    therapeutic activities to improve functional performance for 0 minutes, including:  (not performed 6/23)   Supine cane flexion with with 2 lbs 10 second hold x 10    Cane flexion on wall x 10 with liftoff   Internal rotation with cane behind back 10 x 10 second hold   Active cane flexion (back against wall) x 20 with 2 lbs      Ice massage x 5 minutes to right supraspinatus insertion       Patient Education and Home Exercises     Home Exercises Provided and Patient Education Provided     Education provided:   - blue band: rows, extension, tricep    Written Home Exercises Provided: Patient instructed to cont prior HEP. Exercises were reviewed and Javad was able to demonstrate them prior to the end of the session.  Javad demonstrated good  understanding of the education provided. See EMR under Patient Instructions for exercises provided during therapy sessions    ASSESSMENT       Javad is a 52 y.o. male referred to outpatient Physical Therapy with a medical diagnosis of s/p left rotator cuff repair. Pt presents with typical postop symptoms of pain, decreased range of motion, decreased strength, poor scapular stability and very limited functional use of left upper extremity. 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 He  is able to sleep in the bed propped on pillows. He is back driving some. He still requires assistance with dressing and showering. He was still in the sling upon arrival but instructed patient to begin weaning out of the sling at home and only wear it when he goes outside or out into the community.      Javad was still complaining of some pain around the supraspinatus insertion. He received myofascial release to supraspinatus again with less intensity with exercises and ended with ice massage to decrease inflammation.  Javad Is progressing towards his goals.   Pt prognosis is Good.     Pt will continue to benefit from skilled outpatient physical therapy to address the deficits listed in the problem list box on initial evaluation, provide pt/family education and to maximize pt's level of independence in the home and community environment.     Pt's spiritual, cultural and educational needs considered and pt agreeable to plan of care and goals.     Anticipated barriers to physical therapy: none    Goals:   Short Term Goals: 6 weeks  1. Patient will be independent with home exercise program. MET  2. Patient will have passive range of motion as follows - 130 degrees flexion, 45 degrees external rotation and internal rotation. MET  3. Patient will be independent with dressing and driving. MET  4. Patient will be able to sleep all night in bed. IMPROVING - he says his shoulder still wakes him at times     Long Term Goals: 12 weeks  1. Patient will have active range of motion as follow - 140 degrees flexion, 60 degrees external rotation and functional internal rotation to T12 for reaching overhead, behind head and behind back for dressing, grooming and hygiene.  IMPROVING - flexion 136 degrees, external rotation in adduction 45 degrees, external rotation functionally C2, and internal rotation to PSIS with assist of other hand  2. Patient will have 4+/5 strength left shoulder/upper extremity to perform household chores and  work related tasks. NOT MET  3. Patient will place 3# dumbbell on head height shelf without hiking or pain left shoulder. NOT MET  4. Patient will return to performing all household and outdoor chores. NOT MET      PLAN     Continue Plan of Care for Outpatient Physical Therapy 2 times weekly for 12 weeks to include the following interventions: Electrical Stimulation premod/IFC/hivolt as needed, Manual Therapy, Moist Heat/ Ice, Neuromuscular Re-ed, Patient Education, Therapeutic Activities, Therapeutic Exercise and Ultrasound.        Kaylee Albert, PTA

## 2022-06-28 ENCOUNTER — CLINICAL SUPPORT (OUTPATIENT)
Dept: REHABILITATION | Facility: HOSPITAL | Age: 52
End: 2022-06-28
Attending: NURSE PRACTITIONER
Payer: MEDICAID

## 2022-06-28 DIAGNOSIS — M25.612 DECREASED ROM OF LEFT SHOULDER: Primary | ICD-10-CM

## 2022-06-28 DIAGNOSIS — M25.512 LEFT SHOULDER PAIN, UNSPECIFIED CHRONICITY: ICD-10-CM

## 2022-06-28 DIAGNOSIS — Z98.890 S/P LEFT ROTATOR CUFF REPAIR: ICD-10-CM

## 2022-06-28 PROCEDURE — 97112 NEUROMUSCULAR REEDUCATION: CPT | Mod: CQ

## 2022-06-28 PROCEDURE — 97110 THERAPEUTIC EXERCISES: CPT | Mod: CQ

## 2022-06-28 NOTE — PROGRESS NOTES
[facility]   Physical Therapy Treatment Note     Name: Javad Garcia  Clinic Number: [id number]    Therapy Diagnosis:   No diagnosis found.  Physician: Feroz Encinas FNP    Visit Date: 6/28/2022    Physician Orders: PT Eval and Treat   Medical Diagnosis from Referral: s/p left rotator cuff repair   Evaluation Date: 5/4/2022  Authorization Period Expiration: 7/29/2022   Plan of Care Expiration: 7/29/2022  Progress Note Due: 7/7/2022  Visit # / Visits authorized: 14/24    PTA Visit #: 2    Time In: 10:07 am  Time Out: 10:46 am   Total Billable Time: 39 minutes    SUBJECTIVE     Pt reports: he is feeling a little better in the supraspinatus area.  He was compliant with home exercise program.  Response to previous treatment: no complaint   Functional change: getting a little bit easier to move, able to sleep at night, driving    Pain: 6.5/10 with pain meds   Location: left shoulder      OBJECTIVE     Active range of motion left shoulder: flexion 136 degrees, external rotation in adduction 45 degrees, external rotation functionally C2, and internal rotation to PSIS with assist of other hand  Passive range of motion left shoulder flexion 145 degrees, external rotation and it to 45-50 degrees - all in supine    Treatment     Javad received the treatments listed below:      therapeutic exercises to develop strength, ROM and flexibility for 8 minutes including:  UBE x 6 minutes   Scapular retraction with extension blue 15 x 10 second hold       manual therapy techniques: Joint mobilizations, Myofacial release and Soft tissue Mobilization were applied to the: left shoulder for 0 minutes, including:  Gentle passive stretching into flexion and external rotation   Grade 1-2 joint mobilizations to promote relaxation and pain relief  Myofascial release to subscapularis and upper traps  CFM to supraspinatus insertion x 0 minutes    neuromuscular re-education activities to improve: Coordination and  Posture for 25 minutes. The following activities were included:  scapular proprioceptive neuromuscular facilitation x 30  Sidelying external rotation with 2 lbs x 30  Sidelying scaption x 20  Cybex rows 3 way - 5 plates x 20 each  Bilateral horizontal abduction red x 20 in front of mirror  Unilateral external rotation red 3 x 10  Close  cables 2 plates x 20 with cues for scapular depression  Posterior deltoid flies 2 lbs 3 x 10    therapeutic activities to improve functional performance for 6 minutes, including:  Supine cane flexion with with 2 lbs 10 second hold x 10    Cane flexion on wall x 10 with liftoff   Internal rotation with cane behind back 0 x 10 second hold   Active cane flexion (back against wall) x 0 with 2 lbs      Ice massage x 0 minutes to right supraspinatus insertion (patient denied need for it)      Patient Education and Home Exercises     Home Exercises Provided and Patient Education Provided     Education provided:   - blue band: rows, extension, tricep    Written Home Exercises Provided: Patient instructed to cont prior HEP. Exercises were reviewed and Javad was able to demonstrate them prior to the end of the session.  Javad demonstrated good  understanding of the education provided. See EMR under Patient Instructions for exercises provided during therapy sessions    ASSESSMENT     Javad is a 52 y.o. male referred to outpatient Physical Therapy with a medical diagnosis of s/p left rotator cuff repair. Pt presents with typical postop symptoms of pain, decreased range of motion, decreased strength, poor scapular stability and very limited functional use of left upper extremity. 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 He is able to sleep in the bed propped on pillows. He is back driving some. He still requires assistance with dressing and showering. He was still in  the sling upon arrival but instructed patient to begin weaning out of the sling at home and only wear it when he goes outside or out into the community.      Javad was still complaining of some pain around the supraspinatus insertion. He was able to complete exercises without complaint of increased pain. He denied need for ice massage after treatment.    Javad Is progressing towards his goals.   Pt prognosis is Good.     Pt will continue to benefit from skilled outpatient physical therapy to address the deficits listed in the problem list box on initial evaluation, provide pt/family education and to maximize pt's level of independence in the home and community environment.     Pt's spiritual, cultural and educational needs considered and pt agreeable to plan of care and goals.     Anticipated barriers to physical therapy: none    Goals:   Short Term Goals: 6 weeks  1. Patient will be independent with home exercise program. MET  2. Patient will have passive range of motion as follows - 130 degrees flexion, 45 degrees external rotation and internal rotation. MET  3. Patient will be independent with dressing and driving. MET  4. Patient will be able to sleep all night in bed. IMPROVING - he says his shoulder still wakes him at times     Long Term Goals: 12 weeks  1. Patient will have active range of motion as follow - 140 degrees flexion, 60 degrees external rotation and functional internal rotation to T12 for reaching overhead, behind head and behind back for dressing, grooming and hygiene.  IMPROVING - flexion 136 degrees, external rotation in adduction 45 degrees, external rotation functionally C2, and internal rotation to PSIS with assist of other hand  2. Patient will have 4+/5 strength left shoulder/upper extremity to perform household chores and work related tasks. NOT MET  3. Patient will place 3# dumbbell on head height shelf without hiking or pain left shoulder. NOT MET  4. Patient will return to performing all  household and outdoor chores. NOT MET      PLAN     Continue Plan of Care for Outpatient Physical Therapy 2 times weekly for 12 weeks to include the following interventions: Electrical Stimulation premod/IFC/hivolt as needed, Manual Therapy, Moist Heat/ Ice, Neuromuscular Re-ed, Patient Education, Therapeutic Activities, Therapeutic Exercise and Ultrasound.        Kaylee Albert, PTA

## 2022-07-05 ENCOUNTER — CLINICAL SUPPORT (OUTPATIENT)
Dept: REHABILITATION | Facility: HOSPITAL | Age: 52
End: 2022-07-05
Attending: NURSE PRACTITIONER
Payer: MEDICAID

## 2022-07-05 DIAGNOSIS — M25.512 CHRONIC LEFT SHOULDER PAIN: ICD-10-CM

## 2022-07-05 DIAGNOSIS — M25.612 DECREASED ROM OF LEFT SHOULDER: Primary | ICD-10-CM

## 2022-07-05 DIAGNOSIS — G89.29 CHRONIC LEFT SHOULDER PAIN: ICD-10-CM

## 2022-07-05 DIAGNOSIS — Z98.890 S/P LEFT ROTATOR CUFF REPAIR: ICD-10-CM

## 2022-07-05 PROCEDURE — 97112 NEUROMUSCULAR REEDUCATION: CPT | Mod: CQ

## 2022-07-05 PROCEDURE — 97140 MANUAL THERAPY 1/> REGIONS: CPT | Mod: CQ

## 2022-07-05 PROCEDURE — 97110 THERAPEUTIC EXERCISES: CPT | Mod: CQ

## 2022-07-05 NOTE — PROGRESS NOTES
[facility]   Physical Therapy Treatment Note     Name: Javad Garcia  Clinic Number: [id number]    Therapy Diagnosis:   Encounter Diagnoses   Name Primary?    Decreased ROM of left shoulder Yes    S/P left rotator cuff repair     Chronic left shoulder pain      Physician: Feroz Encinas FNP    Visit Date: 7/5/2022    Physician Orders: PT Eval and Treat   Medical Diagnosis from Referral: s/p left rotator cuff repair   Evaluation Date: 5/4/2022  Authorization Period Expiration: 7/29/2022   Plan of Care Expiration: 7/29/2022  Progress Note Due: 7/7/2022  Visit # / Visits authorized: 15/24    PTA Visit #: 2    Time In: 0900  Time Out: 0940  Total Billable Time: 40 minutes    SUBJECTIVE     Pt reports: he is doing ok, is a little hung over so not feeling as well  He was compliant with home exercise program.  Response to previous treatment: no complaint   Functional change: getting a little bit easier to move, able to sleep at night, driving    Pain: 6.5/10 with pain meds   Location: left shoulder      OBJECTIVE     Active range of motion left shoulder: flexion 136 degrees, external rotation in adduction 45 degrees, external rotation functionally C2, and internal rotation to PSIS with assist of other hand  Passive range of motion left shoulder flexion 145 degrees, external rotation and it to 45-50 degrees - all in supine    Treatment     Javad received the treatments listed below:      therapeutic exercises to develop strength, ROM and flexibility for 8 minutes including:  UBE x 5 minutes   Scapular retraction with extension blue 15 x 10 second hold NOT THIS VISIT   Shoulder press #2 x 20      manual therapy techniques: Joint mobilizations, Myofacial release and Soft tissue Mobilization were applied to the: left shoulder for 8 minutes, including:  Gentle passive stretching into flexion and external rotation   Grade 1-2 joint mobilizations to promote relaxation and pain relief  Myofascial  release to subscapularis and upper traps  CFM to supraspinatus insertion x 0 minutes    neuromuscular re-education activities to improve: Coordination and Posture for 25 minutes. The following activities were included:  scapular proprioceptive neuromuscular facilitation x 30  Chest press with flexion 2# x 20 with 5 sec hold  Sidelying external rotation with 2 lbs x 30  Sidelying scaption x 20  Cybex rows 3 way - 5 plates x 20 each  Bilateral horizontal abduction red x 20 in front of mirror NOT THIS VISIT   Unilateral external rotation red 3 x 10 NOT THIS VISIT   Close  cables 2 plates x 20 with cues for scapular depression  Posterior deltoid flies 2 lbs 3 x 10 NOT THIS VISIT  Cable pulldowns standing with INTERNAL ROTATION x #4 x20  Seated cable flexion /extension with supinated  x 10 with 10 sec hold #3     therapeutic activities to improve functional performance for 6 minutes, including:  Supine cane flexion with with 2 lbs 10 second hold x 10    Cane flexion on wall x 10 with liftoff   Internal rotation with cane behind back 0 x 10 second hold   Active cane flexion (back against wall) x 0 with 2 lbs      Ice massage x 0 minutes to right supraspinatus insertion (patient denied need for it)      Patient Education and Home Exercises     Home Exercises Provided and Patient Education Provided     Education provided:   - blue band: rows, extension, tricep    Written Home Exercises Provided: Patient instructed to cont prior HEP. Exercises were reviewed and Javad was able to demonstrate them prior to the end of the session.  Javad demonstrated good  understanding of the education provided. See EMR under Patient Instructions for exercises provided during therapy sessions    ASSESSMENT   Pt did well with all new exercises today and finished a little early secondary to illness.    Javad is a 52 y.o. male referred to outpatient Physical Therapy with a medical diagnosis of s/p left rotator cuff repair. Pt presents with  typical postop symptoms of pain, decreased range of motion, decreased strength, poor scapular stability and very limited functional use of left upper extremity. 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 He is able to sleep in the bed propped on pillows. He is back driving some. He still requires assistance with dressing and showering. He was still in the sling upon arrival but instructed patient to begin weaning out of the sling at home and only wear it when he goes outside or out into the community.      Javad was still complaining of some pain around the supraspinatus insertion. He was able to complete exercises without complaint of increased pain. He denied need for ice massage after treatment.    Javad Is progressing towards his goals.   Pt prognosis is Good.     Pt will continue to benefit from skilled outpatient physical therapy to address the deficits listed in the problem list box on initial evaluation, provide pt/family education and to maximize pt's level of independence in the home and community environment.     Pt's spiritual, cultural and educational needs considered and pt agreeable to plan of care and goals.     Anticipated barriers to physical therapy: none    Goals:   Short Term Goals: 6 weeks  1. Patient will be independent with home exercise program. MET  2. Patient will have passive range of motion as follows - 130 degrees flexion, 45 degrees external rotation and internal rotation. MET  3. Patient will be independent with dressing and driving. MET  4. Patient will be able to sleep all night in bed. IMPROVING - he says his shoulder still wakes him at times     Long Term Goals: 12 weeks  1. Patient will have active range of motion as follow - 140 degrees flexion, 60 degrees external rotation and functional internal rotation to T12 for reaching overhead, behind head and  behind back for dressing, grooming and hygiene.  IMPROVING - flexion 136 degrees, external rotation in adduction 45 degrees, external rotation functionally C2, and internal rotation to PSIS with assist of other hand  2. Patient will have 4+/5 strength left shoulder/upper extremity to perform household chores and work related tasks. NOT MET  3. Patient will place 3# dumbbell on head height shelf without hiking or pain left shoulder. NOT MET  4. Patient will return to performing all household and outdoor chores. NOT MET      PLAN     Continue Plan of Care for Outpatient Physical Therapy 2 times weekly for 12 weeks to include the following interventions: Electrical Stimulation premod/IFC/hivolt as needed, Manual Therapy, Moist Heat/ Ice, Neuromuscular Re-ed, Patient Education, Therapeutic Activities, Therapeutic Exercise and Ultrasound.        Georgia Quiroz, PTA

## 2022-07-07 ENCOUNTER — CLINICAL SUPPORT (OUTPATIENT)
Dept: REHABILITATION | Facility: HOSPITAL | Age: 52
End: 2022-07-07
Attending: NURSE PRACTITIONER
Payer: MEDICAID

## 2022-07-07 DIAGNOSIS — M25.512 ACUTE PAIN OF LEFT SHOULDER: ICD-10-CM

## 2022-07-07 DIAGNOSIS — M25.612 DECREASED ROM OF LEFT SHOULDER: Primary | ICD-10-CM

## 2022-07-07 DIAGNOSIS — Z98.890 S/P LEFT ROTATOR CUFF REPAIR: ICD-10-CM

## 2022-07-07 PROCEDURE — 97112 NEUROMUSCULAR REEDUCATION: CPT | Mod: CQ

## 2022-07-07 PROCEDURE — 97140 MANUAL THERAPY 1/> REGIONS: CPT | Mod: CQ

## 2022-07-07 PROCEDURE — 97110 THERAPEUTIC EXERCISES: CPT | Mod: CQ

## 2022-07-07 NOTE — PROGRESS NOTES
[facility]   Physical Therapy Progress Note      Name: Javad Garcia  Clinic Number: [id number]     Therapy Diagnosis:        Encounter Diagnoses   Name Primary?    Decreased ROM of left shoulder Yes    S/P left rotator cuff repair      Acute pain of left shoulder        Physician: Feroz Encinas FNP     Visit Date: 7/7/2022     Physician Orders: PT Eval and Treat   Medical Diagnosis from Referral: s/p left rotator cuff repair   Evaluation Date: 5/4/2022  Authorization Period Expiration: 7/29/2022   Plan of Care Expiration: 7/29/2022  Progress Note Due: 7/7/2022  Visit # / Visits authorized: 16/24    See daily note by Mirella Gonzalez PTA for today's physical therapy treatment.    Goals:   Short Term Goals: 6 weeks  1. Patient will be independent with home exercise program. MET  2. Patient will have passive range of motion as follows - 130 degrees flexion, 45 degrees external rotation and internal rotation. MET  3. Patient will be independent with dressing and driving. MET  4. Patient will be able to sleep all night in bed. MET     Long Term Goals: 12 weeks  1. Patient will have active range of motion as follow - 140 degrees flexion, 60 degrees external rotation and functional internal rotation to T12 for reaching overhead, behind head and behind back for dressing, grooming and hygiene.  IMPROVING - flexion 165 degrees, external rotation in adduction 56 degrees, external rotation functionally C5, and internal rotation to T12 with assist of other hand  2. Patient will have 4+/5 strength left shoulder/upper extremity to perform household chores and work related tasks. NOT MET 4/5  3. Patient will place 3# dumbbell on head height shelf without hiking or pain left shoulder. NOT MET  4. Patient will return to performing all household and outdoor chores. Ongoing     ASSESSMENT   Patient tolerated treatment session well today without any pain except for with internal rotation. Goals re-assed  today. He has good range of motion and strength is improving. He still complains of pain around the supraspinatus insertion at times and with active internal rotation. Will continue to progress as tolerated. Progress toward goals updated above in red.       PLAN      Continue current plan of care for Outpatient Physical Therapy 2 times weekly for 12 weeks through 7/29/2022 to include the following interventions: Electrical Stimulation premod/IFC/hivolt as needed, Manual Therapy, Moist Heat/ Ice, Neuromuscular Re-ed, Patient Education, Therapeutic Activities, Therapeutic Exercise and Ultrasound.        MURIEL SHAH, PT  7/7/2022

## 2022-07-07 NOTE — PROGRESS NOTES
[facility]   Physical Therapy Treatment Note     Name: Javad Garcia  Clinic Number: [id number]    Therapy Diagnosis:   Encounter Diagnoses   Name Primary?    Decreased ROM of left shoulder Yes    S/P left rotator cuff repair     Acute pain of left shoulder      Physician: Feroz Encinas FNP    Visit Date: 7/7/2022    Physician Orders: PT Eval and Treat   Medical Diagnosis from Referral: s/p left rotator cuff repair   Evaluation Date: 5/4/2022  Authorization Period Expiration: 7/29/2022   Plan of Care Expiration: 7/29/2022  Progress Note Due: 7/7/2022  Visit # / Visits authorized: 16/24    PTA Visit #: 4    Time In: 0910  Time Out: 0950  Total Billable Time: 40 minutes    SUBJECTIVE     Pt reports: he is doing good but he has trouble with internal rotation  He was compliant with home exercise program.  Response to previous treatment: no complaint   Functional change: getting a little bit easier to move, able to sleep at night, driving    Pain: 0/10   Location: left shoulder      OBJECTIVE     Active range of motion left shoulder: flexion 136 degrees, external rotation in adduction 45 degrees, external rotation functionally C2, and internal rotation to PSIS with assist of other hand  Passive range of motion left shoulder flexion 145 degrees, external rotation and it to 45-50 degrees - all in supine    Treatment     Javad received the treatments listed below:      therapeutic exercises to develop strength, ROM and flexibility for 8 minutes including:  UBE x 5 minutes   Scapular retraction with extension blue 15 x 10 second hold NOT THIS VISIT   Shoulder press #2 x 20      manual therapy techniques: Joint mobilizations, Myofacial release and Soft tissue Mobilization were applied to the: left shoulder for 8 minutes, including:  Gentle passive stretching into flexion and external rotation   Grade 1-2 joint mobilizations to promote relaxation and pain relief  Myofascial release to  subscapularis and upper traps  CFM to supraspinatus insertion x 0 minutes    neuromuscular re-education activities to improve: Coordination and Posture for 22 minutes. The following activities were included:  scapular proprioceptive neuromuscular facilitation x 30  Chest press with flexion 3# x 20 with 5 sec hold  Sidelying external rotation with 2 lbs x 30  Sidelying scaption x 20  Cybex rows 3 way - 5 plates x 20 each  Bilateral horizontal abduction blue x 20 in front of mirror   Bilateral exterenal rotation  Unilateral external rotation red 3 x 10 NOT THIS VISIT   Close  cables 2 plates x 20 with cues for scapular depression  Posterior deltoid flies 2 lbs 3 x 10 NOT THIS VISIT  Cable pulldowns standing with INTERNAL ROTATION x #4 x20  Seated cable flexion /extension with supinated  x 10 with 10 sec hold #3     therapeutic activities to improve functional performance for 2 minutes, including:  Supine cane flexion with with 2 lbs 10 second hold x 10    Cane flexion on wall x 10 with liftoff   Internal rotation with cane behind back 0 x 10 second hold   Active cane flexion (back against wall) x 20 with 2 lbs      Ice massage x 0 minutes to right supraspinatus insertion (patient denied need for it)      Patient Education and Home Exercises     Home Exercises Provided and Patient Education Provided     Education provided:   - blue band: rows, extension, tricep    Written Home Exercises Provided: Patient instructed to cont prior HEP. Exercises were reviewed and Javad was able to demonstrate them prior to the end of the session.  Javad demonstrated good  understanding of the education provided. See EMR under Patient Instructions for exercises provided during therapy sessions    ASSESSMENT   Patient tolerated treatment session well today without any pain except for with internal rotation. Goals re-assed today.    Javad is a 52 y.o. male referred to outpatient Physical Therapy with a medical diagnosis of s/p left  rotator cuff repair. Pt presents with typical postop symptoms of pain, decreased range of motion, decreased strength, poor scapular stability and very limited functional use of left upper extremity. 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 He is able to sleep in the bed propped on pillows. He is back driving some. He still requires assistance with dressing and showering. He was still in the sling upon arrival but instructed patient to begin weaning out of the sling at home and only wear it when he goes outside or out into the community.      Javad was still complaining of some pain around the supraspinatus insertion. He was able to complete exercises without complaint of increased pain. He denied need for ice massage after treatment.    Javad Is progressing towards his goals.   Pt prognosis is Good.     Pt will continue to benefit from skilled outpatient physical therapy to address the deficits listed in the problem list box on initial evaluation, provide pt/family education and to maximize pt's level of independence in the home and community environment.     Pt's spiritual, cultural and educational needs considered and pt agreeable to plan of care and goals.     Anticipated barriers to physical therapy: none    Goals:   Short Term Goals: 6 weeks  1. Patient will be independent with home exercise program. MET  2. Patient will have passive range of motion as follows - 130 degrees flexion, 45 degrees external rotation and internal rotation. MET  3. Patient will be independent with dressing and driving. MET  4. Patient will be able to sleep all night in bed. MET     Long Term Goals: 12 weeks  1. Patient will have active range of motion as follow - 140 degrees flexion, 60 degrees external rotation and functional internal rotation to T12 for reaching overhead, behind head and behind back for  dressing, grooming and hygiene.  IMPROVING - flexion 165 degrees, external rotation in adduction 56 degrees, external rotation functionally C5, and internal rotation to T12 with assist of other hand  2. Patient will have 4+/5 strength left shoulder/upper extremity to perform household chores and work related tasks. NOT MET 4/5  3. Patient will place 3# dumbbell on head height shelf without hiking or pain left shoulder. NOT MET  4. Patient will return to performing all household and outdoor chores. Ongoing       PLAN     Continue Plan of Care for Outpatient Physical Therapy 2 times weekly for 12 weeks to include the following interventions: Electrical Stimulation premod/IFC/hivolt as needed, Manual Therapy, Moist Heat/ Ice, Neuromuscular Re-ed, Patient Education, Therapeutic Activities, Therapeutic Exercise and Ultrasound.        Mirella Gonzalez, PTA

## 2022-07-12 ENCOUNTER — CLINICAL SUPPORT (OUTPATIENT)
Dept: REHABILITATION | Facility: HOSPITAL | Age: 52
End: 2022-07-12
Attending: NURSE PRACTITIONER
Payer: MEDICAID

## 2022-07-12 DIAGNOSIS — Z98.890 S/P LEFT ROTATOR CUFF REPAIR: ICD-10-CM

## 2022-07-12 DIAGNOSIS — G89.29 CHRONIC LEFT SHOULDER PAIN: ICD-10-CM

## 2022-07-12 DIAGNOSIS — M25.612 DECREASED ROM OF LEFT SHOULDER: Primary | ICD-10-CM

## 2022-07-12 DIAGNOSIS — M25.512 CHRONIC LEFT SHOULDER PAIN: ICD-10-CM

## 2022-07-12 PROCEDURE — 97112 NEUROMUSCULAR REEDUCATION: CPT

## 2022-07-12 PROCEDURE — 97530 THERAPEUTIC ACTIVITIES: CPT

## 2022-07-12 PROCEDURE — 97110 THERAPEUTIC EXERCISES: CPT

## 2022-07-12 NOTE — PROGRESS NOTES
[facility]   Physical Therapy Treatment Note     Name: Javad Garcia  Clinic Number: [id number]    Therapy Diagnosis:   Encounter Diagnoses   Name Primary?    Decreased ROM of left shoulder Yes    S/P left rotator cuff repair     Chronic left shoulder pain      Physician: Feroz Encinas FNP    Visit Date: 7/12/2022    Physician Orders: PT Eval and Treat   Medical Diagnosis from Referral: s/p left rotator cuff repair   Evaluation Date: 5/4/2022  Authorization Period Expiration: 7/29/2022   Plan of Care Expiration: 7/29/2022  Progress Note Due: 8/7/2022  Visit # / Visits authorized: 17/24    PTA Visit #:     Time In: 10:03 am  Time Out: 10:43 am  Total Billable Time: 40 minutes    SUBJECTIVE     Pt reports: no new complaints  He was compliant with home exercise program.  Response to previous treatment: no complaint   Functional change: getting a little bit easier to move, able to sleep at night, driving    Pain: 0/10   Location: left shoulder      OBJECTIVE     Active range of motion left shoulder: flexion 136 degrees, external rotation in adduction 45 degrees, external rotation functionally C2, and internal rotation to PSIS with assist of other hand  Passive range of motion left shoulder flexion 145 degrees, external rotation and it to 45-50 degrees - all in supine    Treatment     Javad received the treatments listed below:      therapeutic exercises to develop strength, ROM and flexibility for 10 minutes including:  UBE x 5 minutes   Scapular retraction with extension blue 10 x 10 second hold    Shoulder press 3 plates x 20      manual therapy techniques: Joint mobilizations, Myofacial release and Soft tissue Mobilization were applied to the: left shoulder for 0 minutes, including:  Gentle passive stretching into flexion and external rotation   Grade 1-2 joint mobilizations to promote relaxation and pain relief  Myofascial release to subscapularis and upper traps  CFM to supraspinatus  insertion x 0 minutes    neuromuscular re-education activities to improve: Coordination and Posture for 24 minutes. The following activities were included:  scapular proprioceptive neuromuscular facilitation --  Seated chest press 3 plates x 20   Sidelying external rotation with 2 lbs --  Sidelying scaption --  Cybex rows 3 way - 5 plates x 20 each  Bilateral horizontal abduction blue --   Bilateral exterenal rotation  Unilateral external rotation red 3 x 10    Close  cables 2 plates x 20 with cues for scapular depression  Posterior deltoid flies 2 lbs --  Cable pulldowns standing with INTERNAL ROTATION --  Seated cable flexion /extension with supinated  --  High row/external rotation red 3 x 10  Diagonal lo to high yellow 3 x 10     therapeutic activities to improve functional performance for 6 minutes, including:  Supine cane flexion with with 2 lbs 10 second hold x 10    Cane flexion on wall x 20 with liftoff   Internal rotation with cane behind back --   Active cane flexion (back against wall) x 20 with 2 lbs      Ice massage x 0 minutes to right supraspinatus insertion (patient denied need for it)      Patient Education and Home Exercises     Home Exercises Provided and Patient Education Provided     Education provided:   - blue band: rows, extension, tricep    Written Home Exercises Provided: Patient instructed to cont prior HEP. Exercises were reviewed and Javad was able to demonstrate them prior to the end of the session.  Javad demonstrated good  understanding of the education provided. See EMR under Patient Instructions for exercises provided during therapy sessions    ASSESSMENT   Patient tolerated new exercises today without complaint. His range of motion is excellent but still has some mild pain with internal rotation. His strength is much improved and he was able to perform some more advanced neuromuscular reeducation exercises. He is progressing well towards discharge.     Javad is a 52 y.o. male  referred to outpatient Physical Therapy with a medical diagnosis of s/p left rotator cuff repair. Pt presents with typical postop symptoms of pain, decreased range of motion, decreased strength, poor scapular stability and very limited functional use of left upper extremity. 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 He is able to sleep in the bed propped on pillows. He is back driving some. He still requires assistance with dressing and showering. He was still in the sling upon arrival but instructed patient to begin weaning out of the sling at home and only wear it when he goes outside or out into the community.      Javad was still complaining of some pain around the supraspinatus insertion. He was able to complete exercises without complaint of increased pain. He denied need for ice massage after treatment.    Javad Is progressing towards his goals.   Pt prognosis is Good.     Pt will continue to benefit from skilled outpatient physical therapy to address the deficits listed in the problem list box on initial evaluation, provide pt/family education and to maximize pt's level of independence in the home and community environment.     Pt's spiritual, cultural and educational needs considered and pt agreeable to plan of care and goals.     Anticipated barriers to physical therapy: none    Goals:   Short Term Goals: 6 weeks  1. Patient will be independent with home exercise program. MET  2. Patient will have passive range of motion as follows - 130 degrees flexion, 45 degrees external rotation and internal rotation. MET  3. Patient will be independent with dressing and driving. MET  4. Patient will be able to sleep all night in bed. MET     Long Term Goals: 12 weeks  1. Patient will have active range of motion as follow - 140 degrees flexion, 60 degrees external rotation and functional internal  rotation to T12 for reaching overhead, behind head and behind back for dressing, grooming and hygiene.  IMPROVING - flexion 165 degrees, external rotation in adduction 56 degrees, external rotation functionally C5, and internal rotation to T12 with assist of other hand  2. Patient will have 4+/5 strength left shoulder/upper extremity to perform household chores and work related tasks.  4 to 4+/5 flexion, internal rotation and external rotation; 4- to 4/5 abduction    3. Patient will place 3# dumbbell on head height shelf without hiking or pain left shoulder. NOT ASSESSED  4. Patient will return to performing all household and outdoor chores. IMPROVING       PLAN     Continue Plan of Care for Outpatient Physical Therapy 2 times weekly for 12 weeks to include the following interventions: Electrical Stimulation premod/IFC/hivolt as needed, Manual Therapy, Moist Heat/ Ice, Neuromuscular Re-ed, Patient Education, Therapeutic Activities, Therapeutic Exercise and Ultrasound.        MURIEL SHAH, PT

## 2022-07-18 NOTE — PROGRESS NOTES
[facility]   Physical Therapy Treatment Note     Name: Javad Garcia  Clinic Number: [id number]    Therapy Diagnosis:   No diagnosis found.  Physician: Feroz Encinas FNP    Visit Date: 7/19/2022    Physician Orders: PT Eval and Treat   Medical Diagnosis from Referral: s/p left rotator cuff repair   Evaluation Date: 5/4/2022  Authorization Period Expiration: 7/29/2022   Plan of Care Expiration: 7/29/2022  Progress Note Due: 8/7/2022  Visit # / Visits authorized: 18/24    PTA Visit #: 1    Time In: 10:01 am  Time Out: 10:31 am  Total Billable Time: 30 minutes    SUBJECTIVE     Pt reports: he is doing well, no new complaints.  He was compliant with home exercise program.  Response to previous treatment: no complaint   Functional change: getting a little bit easier to move, able to sleep at night, driving    Pain: 0/10   Location: left shoulder      OBJECTIVE     Active range of motion left shoulder: flexion 153 degrees, external rotation in adduction 65 degrees, external rotation functionally C2, and internal rotation to L4    Treatment     Javad received the treatments listed below:      therapeutic exercises to develop strength, ROM and flexibility for 10 minutes including:  UBE x 5 minutes   Scapular retraction with extension blue 10 x 10 second hold    Shoulder press 3 plates x 30      neuromuscular re-education activities to improve: Coordination and Posture for 13 minutes. The following activities were included:  scapular proprioceptive neuromuscular facilitation --  Seated chest press 3 plates x 30   Cybex rows 3 way - 5 plates x 20 each  Unilateral external rotation red 3 x 10    Close  cables 2 plates x 30 with cues for scapular depression  Posterior deltoid flies 2 lbs --  High row/external rotation red 3 x 10  Diagonal low to high yellow 3 x 10     therapeutic activities to improve functional performance for 7 minutes, including:  Supine cane flexion with with 2 lbs    Cane  flexion on wall x 30 with liftoff   Active cane flexion (back against wall)   Anterior delt raises with 2 lbs 3 x 10  Wall dolores holds         Patient Education and Home Exercises     Home Exercises Provided and Patient Education Provided     Education provided:   - blue band: rows, extension, tricep    Written Home Exercises Provided: Patient instructed to cont prior HEP. Exercises were reviewed and Javad was able to demonstrate them prior to the end of the session.  Javad demonstrated good  understanding of the education provided. See EMR under Patient Instructions for exercises provided during therapy sessions    ASSESSMENT     Patient did not take rest breaks very often to start but by end of treatment he was resting between sets.  He needed several cues to slow down and stop using momentum.  He completed exercises per log in 30 minutes but was very fatigued afterward. He especially fatigued quickly with high row with external rotation and anterior deltoid raises. He is progressing well towards discharge.     PMH:  Javad is a 52 y.o. male referred to outpatient Physical Therapy with a medical diagnosis of s/p left rotator cuff repair. Pt presents with typical postop symptoms of pain, decreased range of motion, decreased strength, poor scapular stability and very limited functional use of left upper extremity. 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 He is able to sleep in the bed propped on pillows. He is back driving some. He still requires assistance with dressing and showering. He was still in the sling upon arrival but instructed patient to begin weaning out of the sling at home and only wear it when he goes outside or out into the community.      Javad was still complaining of some pain around the supraspinatus insertion. He was able to complete exercises without complaint of increased  pain. He denied need for ice massage after treatment.    Javad Is progressing towards his goals.   Pt prognosis is Good.     Pt will continue to benefit from skilled outpatient physical therapy to address the deficits listed in the problem list box on initial evaluation, provide pt/family education and to maximize pt's level of independence in the home and community environment.     Pt's spiritual, cultural and educational needs considered and pt agreeable to plan of care and goals.     Anticipated barriers to physical therapy: none    Goals:   Short Term Goals: 6 weeks  1. Patient will be independent with home exercise program. MET  2. Patient will have passive range of motion as follows - 130 degrees flexion, 45 degrees external rotation and internal rotation. MET  3. Patient will be independent with dressing and driving. MET  4. Patient will be able to sleep all night in bed. MET     Long Term Goals: 12 weeks  1. Patient will have active range of motion as follow - 140 degrees flexion, 60 degrees external rotation and functional internal rotation to T12 for reaching overhead, behind head and behind back for dressing, grooming and hygiene.  IMPROVING - flexion 165 degrees, external rotation in adduction 56 degrees, external rotation functionally C5, and internal rotation to T12 with assist of other hand  2. Patient will have 4+/5 strength left shoulder/upper extremity to perform household chores and work related tasks.  4 to 4+/5 flexion, internal rotation and external rotation; 4- to 4/5 abduction    3. Patient will place 3# dumbbell on head height shelf without hiking or pain left shoulder. NOT ASSESSED  4. Patient will return to performing all household and outdoor chores. IMPROVING       PLAN     Continue Plan of Care to advance toward functional goals for return to normal activities.      Kaylee Albert, PTA

## 2022-07-19 ENCOUNTER — CLINICAL SUPPORT (OUTPATIENT)
Dept: REHABILITATION | Facility: HOSPITAL | Age: 52
End: 2022-07-19
Attending: NURSE PRACTITIONER
Payer: MEDICAID

## 2022-07-19 DIAGNOSIS — M25.612 DECREASED ROM OF LEFT SHOULDER: Primary | ICD-10-CM

## 2022-07-19 DIAGNOSIS — Z98.890 S/P LEFT ROTATOR CUFF REPAIR: ICD-10-CM

## 2022-07-19 DIAGNOSIS — M25.512 LEFT SHOULDER PAIN, UNSPECIFIED CHRONICITY: ICD-10-CM

## 2022-07-19 PROCEDURE — 97112 NEUROMUSCULAR REEDUCATION: CPT | Mod: CQ

## 2022-07-19 PROCEDURE — 97110 THERAPEUTIC EXERCISES: CPT | Mod: CQ

## 2022-07-21 ENCOUNTER — CLINICAL SUPPORT (OUTPATIENT)
Dept: REHABILITATION | Facility: HOSPITAL | Age: 52
End: 2022-07-21
Attending: NURSE PRACTITIONER
Payer: MEDICAID

## 2022-07-21 DIAGNOSIS — M25.512 LEFT SHOULDER PAIN, UNSPECIFIED CHRONICITY: ICD-10-CM

## 2022-07-21 DIAGNOSIS — Z98.890 S/P LEFT ROTATOR CUFF REPAIR: ICD-10-CM

## 2022-07-21 DIAGNOSIS — M25.612 DECREASED ROM OF LEFT SHOULDER: Primary | ICD-10-CM

## 2022-07-21 PROCEDURE — 97530 THERAPEUTIC ACTIVITIES: CPT | Mod: CQ

## 2022-07-21 PROCEDURE — 97112 NEUROMUSCULAR REEDUCATION: CPT | Mod: CQ

## 2022-07-21 PROCEDURE — 97110 THERAPEUTIC EXERCISES: CPT | Mod: CQ

## 2022-07-21 NOTE — PROGRESS NOTES
[facility]   Physical Therapy Treatment Note     Name: Javad Garcia  Clinic Number: [id number]    Therapy Diagnosis:   Encounter Diagnoses   Name Primary?    Decreased ROM of left shoulder Yes    S/P left rotator cuff repair     Left shoulder pain, unspecified chronicity      Physician: Ferzo Encinas FNP    Visit Date: 7/21/2022    Physician Orders: PT Eval and Treat   Medical Diagnosis from Referral: s/p left rotator cuff repair   Evaluation Date: 5/4/2022  Authorization Period Expiration: 7/29/2022   Plan of Care Expiration: 7/29/2022  Progress Note Due: 8/7/2022  Visit # / Visits authorized: 19/24    PTA Visit #: 2    Time In: 10:06 am  Time Out: 10:44 am  Total Billable Time: 38 minutes    SUBJECTIVE     Pt reports: no new complaints, he felt good enough that he even forgot at one point.  He was compliant with home exercise program.  Response to previous treatment: no complaint   Functional change: getting a little bit easier to move, able to sleep at night, driving    Pain: 0/10   Location: left shoulder      OBJECTIVE     Active range of motion left shoulder: flexion 153 degrees, external rotation in adduction 65 degrees, external rotation functionally C2, and internal rotation to L4    Treatment     Javad received the treatments listed below:      therapeutic exercises to develop strength, ROM and flexibility for 10 minutes including:  UBE x 5 minutes   Scapular retraction with extension blue 10 x 10 second hold    Shoulder press 3 plates x 30      neuromuscular re-education activities to improve: Coordination and Posture for 19 minutes. The following activities were included:  scapular proprioceptive neuromuscular facilitation x 30  sidelying external rotation with manual resistance 3 x 10  Seated chest press 3 plates x 30   Cybex rows 3 way - 5 plates x 20 each  Unilateral external rotation red 3 x 10    Close  cables 2 plates x 30 with cues for scapular  depression  Posterior deltoid flies 2 lbs --  High row/external rotation red 3 x 10  Diagonal low to high yellow 3 x 10     therapeutic activities to improve functional performance for 9 minutes, including:  Supine cane flexion with with 3 lbs 10 x 10 second hold     Supine dumbbell flexion with 2 lbs x 20  Cane flexion on wall with liftoff    Anterior delt raises with 2 lbs 3 x 10  Wall dolores holds         Patient Education and Home Exercises     Home Exercises Provided and Patient Education Provided     Education provided:   - blue band: rows, extension, tricep    Written Home Exercises Provided: Patient instructed to cont prior HEP. Exercises were reviewed and Javad was able to demonstrate them prior to the end of the session.  Javad demonstrated good  understanding of the education provided. See EMR under Patient Instructions for exercises provided during therapy sessions    ASSESSMENT     Javad did well with exercises today. He still fatigues quickly with higher level activities. He was able correct scapular placement with visual cues instead of tactile today. He is progressing well towards discharge goals.     PMH:  Javad is a 52 y.o. male referred to outpatient Physical Therapy with a medical diagnosis of s/p left rotator cuff repair. Pt presents with typical postop symptoms of pain, decreased range of motion, decreased strength, poor scapular stability and very limited functional use of left upper extremity. 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 He is able to sleep in the bed propped on pillows. He is back driving some. He still requires assistance with dressing and showering. He was still in the sling upon arrival but instructed patient to begin weaning out of the sling at home and only wear it when he goes outside or out into the community.      Javad was still complaining of some  pain around the supraspinatus insertion. He was able to complete exercises without complaint of increased pain. He denied need for ice massage after treatment.    Javad Is progressing towards his goals.   Pt prognosis is Good.     Pt will continue to benefit from skilled outpatient physical therapy to address the deficits listed in the problem list box on initial evaluation, provide pt/family education and to maximize pt's level of independence in the home and community environment.     Pt's spiritual, cultural and educational needs considered and pt agreeable to plan of care and goals.     Anticipated barriers to physical therapy: none    Goals:   Short Term Goals: 6 weeks  1. Patient will be independent with home exercise program. MET  2. Patient will have passive range of motion as follows - 130 degrees flexion, 45 degrees external rotation and internal rotation. MET  3. Patient will be independent with dressing and driving. MET  4. Patient will be able to sleep all night in bed. MET     Long Term Goals: 12 weeks  1. Patient will have active range of motion as follow - 140 degrees flexion, 60 degrees external rotation and functional internal rotation to T12 for reaching overhead, behind head and behind back for dressing, grooming and hygiene.  IMPROVING - flexion 165 degrees, external rotation in adduction 56 degrees, external rotation functionally C5, and internal rotation to T12 with assist of other hand  2. Patient will have 4+/5 strength left shoulder/upper extremity to perform household chores and work related tasks.  4 to 4+/5 flexion, internal rotation and external rotation; 4- to 4/5 abduction    3. Patient will place 3# dumbbell on head height shelf without hiking or pain left shoulder. NOT ASSESSED  4. Patient will return to performing all household and outdoor chores. IMPROVING       PLAN     Continue Plan of Care to advance toward functional goals for return to normal activities.      Kaylee Albert,  PTA

## 2022-07-26 ENCOUNTER — CLINICAL SUPPORT (OUTPATIENT)
Dept: REHABILITATION | Facility: HOSPITAL | Age: 52
End: 2022-07-26
Attending: NURSE PRACTITIONER
Payer: MEDICAID

## 2022-07-26 DIAGNOSIS — M25.612 DECREASED ROM OF LEFT SHOULDER: Primary | ICD-10-CM

## 2022-07-26 DIAGNOSIS — Z98.890 S/P LEFT ROTATOR CUFF REPAIR: ICD-10-CM

## 2022-07-26 DIAGNOSIS — M25.512 LEFT SHOULDER PAIN, UNSPECIFIED CHRONICITY: ICD-10-CM

## 2022-07-26 PROCEDURE — 97112 NEUROMUSCULAR REEDUCATION: CPT | Mod: CQ

## 2022-07-26 PROCEDURE — 97110 THERAPEUTIC EXERCISES: CPT | Mod: CQ

## 2022-07-26 NOTE — PROGRESS NOTES
[facility]   Physical Therapy Treatment Note     Name: Javad Garcia  Clinic Number: [id number]    Therapy Diagnosis:   No diagnosis found.  Physician: Feroz Encinas FNP    Visit Date: 7/26/2022    Physician Orders: PT Eval and Treat   Medical Diagnosis from Referral: s/p left rotator cuff repair   Evaluation Date: 5/4/2022  Authorization Period Expiration: 7/29/2022   Plan of Care Expiration: 7/29/2022  Progress Note Due: 8/7/2022  Visit # / Visits authorized: 20/24    PTA Visit #: 3    Time In: 10:03 am  Time Out: 10:35 am  Total Billable Time: 23 minutes    SUBJECTIVE     Pt reports: he is doing alright. He was able to change a tire this morning.  He was compliant with home exercise program.  Response to previous treatment: no complaint   Functional change: getting a little bit easier to move, able to sleep at night, driving    Pain: 0/10   Location: left shoulder      OBJECTIVE     Active range of motion left shoulder: flexion 153 degrees, external rotation in adduction 65 degrees, external rotation functionally C2, and internal rotation to L4    Treatment     Javad received the treatments listed below:      therapeutic exercises to develop strength, ROM and flexibility for 10 minutes including:  UBE x 5 minutes   Scapular retraction with extension blue 10 x 10 second hold    Horizontal abduction with blue x 30    neuromuscular re-education activities to improve: Coordination and Posture for 10 minutes. The following activities were included:  Unilateral external rotation red 3 x 10    High row/external rotation red 3 x 10    therapeutic activities to improve functional performance for 3 minutes, including:  Supine cane flexion with with 3 lbs 10 x 10 second hold           Patient Education and Home Exercises     Home Exercises Provided and Patient Education Provided     Education provided:   - blue band: rows, extension, tricep    Written Home Exercises Provided: Patient instructed  to cont prior HEP. Exercises were reviewed and Javad was able to demonstrate them prior to the end of the session.  Javad demonstrated good  understanding of the education provided. See EMR under Patient Instructions for exercises provided during therapy sessions    ASSESSMENT     Javad arrived with 0/10 pain. He reports independence with all daily activities and household activities. He was able to change a tire independently this morning. Goals were checked and patient showed independence with all without pain. Will discharge to home exercise program at this time.     PMH:  Javad is a 52 y.o. male referred to outpatient Physical Therapy with a medical diagnosis of s/p left rotator cuff repair. Pt presents with typical postop symptoms of pain, decreased range of motion, decreased strength, poor scapular stability and very limited functional use of left upper extremity. 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 He is able to sleep in the bed propped on pillows. He is back driving some. He still requires assistance with dressing and showering. He was still in the sling upon arrival but instructed patient to begin weaning out of the sling at home and only wear it when he goes outside or out into the community.      Javad Is progressing towards his goals.   Pt prognosis is Good.     Pt's spiritual, cultural and educational needs considered and pt agreeable to plan of care and goals.     Anticipated barriers to physical therapy: none    Goals:   Short Term Goals: 6 weeks  1. Patient will be independent with home exercise program. MET  2. Patient will have passive range of motion as follows - 130 degrees flexion, 45 degrees external rotation and internal rotation. MET  3. Patient will be independent with dressing and driving. MET  4. Patient will be able to sleep all night in bed. MET     Long Term  Goals: 12 weeks  1. Patient will have active range of motion as follow - 140 degrees flexion, 60 degrees external rotation and functional internal rotation to T12 for reaching overhead, behind head and behind back for dressing, grooming and hygiene.  MET - flexion 165 degrees, external rotation in adduction 63 degrees, external rotation functionally C5, and internal rotation to T12 with assist of other hand  2. Patient will have 4+/5 strength left shoulder/upper extremity to perform household chores and work related tasks.  4+/5 flexion, internal rotation and external rotation;  4+/5 abduction    3. Patient will place 3# dumbbell on head height shelf without hiking or pain left shoulder. MET  4. Patient will return to performing all household and outdoor chores. MET       PLAN     Discharge to home exercise program.      Kaylee Albert, PTA

## 2022-08-08 ENCOUNTER — DOCUMENTATION ONLY (OUTPATIENT)
Dept: REHABILITATION | Facility: HOSPITAL | Age: 52
End: 2022-08-08
Payer: MEDICAID

## 2022-08-08 PROBLEM — M25.512 LEFT SHOULDER PAIN: Status: RESOLVED | Noted: 2022-05-05 | Resolved: 2022-08-08

## 2022-08-08 PROBLEM — Z98.890 S/P LEFT ROTATOR CUFF REPAIR: Status: RESOLVED | Noted: 2022-04-05 | Resolved: 2022-08-08

## 2022-08-08 PROBLEM — M25.612 DECREASED ROM OF LEFT SHOULDER: Status: RESOLVED | Noted: 2022-05-05 | Resolved: 2022-08-08

## 2022-08-08 NOTE — PROGRESS NOTES
[facility]   Physical Therapy Discharge Summary      Name: Javad Garcia  Clinic Number: [id number]     Therapy Diagnosis:   No diagnosis found.  Physician: Feroz Encinas FNP     Last Visit Date: 7/26/2022     Physician Orders: PT Eval and Treat   Medical Diagnosis from Referral: s/p left rotator cuff repair   Evaluation Date: 5/4/2022  Authorization Period Expiration: 7/29/2022   Plan of Care Expiration: 7/29/2022  Progress Note Due: 8/7/2022  Visit # / Visits authorized: 20/24      Assessment    Goals:   Short Term Goals: 6 weeks  1. Patient will be independent with home exercise program. MET  2. Patient will have passive range of motion as follows - 130 degrees flexion, 45 degrees external rotation and internal rotation. MET  3. Patient will be independent with dressing and driving. MET  4. Patient will be able to sleep all night in bed. MET     Long Term Goals: 12 weeks  1. Patient will have active range of motion as follow - 140 degrees flexion, 60 degrees external rotation and functional internal rotation to T12 for reaching overhead, behind head and behind back for dressing, grooming and hygiene.  MET - flexion 165 degrees, external rotation in adduction 63 degrees, external rotation functionally C5, and internal rotation to T12 with assist of other hand  2. Patient will have 4+/5 strength left shoulder/upper extremity to perform household chores and work related tasks.  4+/5 flexion, internal rotation and external rotation;  4+/5 abduction    3. Patient will place 3# dumbbell on head height shelf without hiking or pain left shoulder. MET  4. Patient will return to performing all household and outdoor chores. MET       Discharge reason: Patient has met all of his/her goals    Javad arrived with 0/10 pain. He reports independence with all daily activities and household activities. He was able to change a tire independently this morning. Goals were checked and patient showed  independence with all without pain. Will discharge to home exercise program at this time.        Plan   This patient is discharged from Physical Therapy.    MURIEL SHAH, PT  8/8/2022

## 2022-09-21 ENCOUNTER — HOSPITAL ENCOUNTER (OUTPATIENT)
Dept: RADIOLOGY | Facility: HOSPITAL | Age: 52
Discharge: HOME | End: 2022-09-21
Attending: NURSE PRACTITIONER
Payer: MEDICAID

## 2022-09-21 DIAGNOSIS — M25.551 RIGHT HIP PAIN: ICD-10-CM

## 2022-09-21 PROCEDURE — 72170 X-RAY EXAM OF PELVIS: CPT | Mod: 26,,, | Performed by: RADIOLOGY

## 2022-09-21 PROCEDURE — 72170 XR PELVIS ROUTINE AP: ICD-10-PCS | Mod: 26,,, | Performed by: RADIOLOGY

## 2022-09-21 PROCEDURE — 72170 X-RAY EXAM OF PELVIS: CPT | Mod: TC

## 2023-01-13 ENCOUNTER — OFFICE VISIT (OUTPATIENT)
Dept: FAMILY MEDICINE | Facility: CLINIC | Age: 53
End: 2023-01-13
Payer: MEDICAID

## 2023-01-13 VITALS
WEIGHT: 215 LBS | DIASTOLIC BLOOD PRESSURE: 86 MMHG | HEART RATE: 84 BPM | TEMPERATURE: 98 F | SYSTOLIC BLOOD PRESSURE: 125 MMHG | HEIGHT: 69 IN | BODY MASS INDEX: 31.84 KG/M2 | RESPIRATION RATE: 20 BRPM | OXYGEN SATURATION: 97 %

## 2023-01-13 DIAGNOSIS — M25.551 RIGHT HIP PAIN: Primary | ICD-10-CM

## 2023-01-13 PROCEDURE — 96372 PR INJECTION,THERAP/PROPH/DIAG2ST, IM OR SUBCUT: ICD-10-PCS | Mod: ,,, | Performed by: FAMILY MEDICINE

## 2023-01-13 PROCEDURE — 3008F BODY MASS INDEX DOCD: CPT | Mod: CPTII,,, | Performed by: FAMILY MEDICINE

## 2023-01-13 PROCEDURE — 4010F PR ACE/ARB THEARPY RXD/TAKEN: ICD-10-PCS | Mod: CPTII,,, | Performed by: FAMILY MEDICINE

## 2023-01-13 PROCEDURE — 3074F PR MOST RECENT SYSTOLIC BLOOD PRESSURE < 130 MM HG: ICD-10-PCS | Mod: CPTII,,, | Performed by: FAMILY MEDICINE

## 2023-01-13 PROCEDURE — 99213 OFFICE O/P EST LOW 20 MIN: CPT | Mod: 25,,, | Performed by: FAMILY MEDICINE

## 2023-01-13 PROCEDURE — 3008F PR BODY MASS INDEX (BMI) DOCUMENTED: ICD-10-PCS | Mod: CPTII,,, | Performed by: FAMILY MEDICINE

## 2023-01-13 PROCEDURE — 3079F PR MOST RECENT DIASTOLIC BLOOD PRESSURE 80-89 MM HG: ICD-10-PCS | Mod: CPTII,,, | Performed by: FAMILY MEDICINE

## 2023-01-13 PROCEDURE — 3079F DIAST BP 80-89 MM HG: CPT | Mod: CPTII,,, | Performed by: FAMILY MEDICINE

## 2023-01-13 PROCEDURE — 3074F SYST BP LT 130 MM HG: CPT | Mod: CPTII,,, | Performed by: FAMILY MEDICINE

## 2023-01-13 PROCEDURE — 4010F ACE/ARB THERAPY RXD/TAKEN: CPT | Mod: CPTII,,, | Performed by: FAMILY MEDICINE

## 2023-01-13 PROCEDURE — 99213 PR OFFICE/OUTPT VISIT, EST, LEVL III, 20-29 MIN: ICD-10-PCS | Mod: 25,,, | Performed by: FAMILY MEDICINE

## 2023-01-13 PROCEDURE — 96372 THER/PROPH/DIAG INJ SC/IM: CPT | Mod: ,,, | Performed by: FAMILY MEDICINE

## 2023-01-13 RX ORDER — ACETAMINOPHEN 500 MG
TABLET ORAL
COMMUNITY

## 2023-01-13 RX ORDER — LISINOPRIL 20 MG/1
20 TABLET ORAL
COMMUNITY
Start: 2022-11-27

## 2023-01-13 RX ORDER — DEXAMETHASONE SODIUM PHOSPHATE 4 MG/ML
6 INJECTION, SOLUTION INTRA-ARTICULAR; INTRALESIONAL; INTRAMUSCULAR; INTRAVENOUS; SOFT TISSUE
Status: COMPLETED | OUTPATIENT
Start: 2023-01-13 | End: 2023-01-13

## 2023-01-13 RX ORDER — MELOXICAM 7.5 MG/1
7.5 TABLET ORAL DAILY
Qty: 90 TABLET | Refills: 1 | Status: SHIPPED | OUTPATIENT
Start: 2023-01-13

## 2023-01-13 RX ORDER — TRAZODONE HYDROCHLORIDE 100 MG/1
100-200 TABLET ORAL NIGHTLY PRN
COMMUNITY
Start: 2022-11-27

## 2023-01-13 RX ORDER — PANTOPRAZOLE SODIUM 40 MG/1
TABLET, DELAYED RELEASE ORAL
COMMUNITY

## 2023-01-13 RX ADMIN — DEXAMETHASONE SODIUM PHOSPHATE 6 MG: 4 INJECTION, SOLUTION INTRA-ARTICULAR; INTRALESIONAL; INTRAMUSCULAR; INTRAVENOUS; SOFT TISSUE at 11:01

## 2023-01-13 NOTE — PROGRESS NOTES
Subjective:       Patient ID: Javad Garcia is a 52 y.o. male.    Chief Complaint: Hip Pain (Pain in right hip (requests steroid shot))    Patient had an x-ray done 3 months ago which showed some arthritis in his hip.  He had dramatic improvement after a steroid shot.  He request another.  He has been going to physical therapy.  He says this is required before he can get an MRI.  He has an MRI scheduled.    Hip Pain     Review of Systems      Objective:      Physical Exam  Constitutional:       General: He is in acute distress.      Appearance: He is not ill-appearing.   Cardiovascular:      Rate and Rhythm: Normal rate and regular rhythm.   Pulmonary:      Effort: Pulmonary effort is normal.      Breath sounds: Normal breath sounds.   Musculoskeletal:      Right hip: Decreased range of motion.      Right lower leg: No edema.      Left lower leg: No edema.      Comments: Severe pain with manipulation of right hip   Neurological:      Mental Status: He is alert.       Assessment:       Problem List Items Addressed This Visit    None  Visit Diagnoses       Right hip pain    -  Primary            Plan:       Decadron 6 mg IM.  Follow-up as already scheduled.

## 2023-03-28 DIAGNOSIS — Z96.641 S/P TOTAL RIGHT HIP ARTHROPLASTY: Primary | ICD-10-CM

## 2023-04-04 ENCOUNTER — CLINICAL SUPPORT (OUTPATIENT)
Dept: REHABILITATION | Facility: HOSPITAL | Age: 53
End: 2023-04-04
Payer: MEDICAID

## 2023-04-04 DIAGNOSIS — G89.18 ACUTE POSTOPERATIVE PAIN OF RIGHT HIP: ICD-10-CM

## 2023-04-04 DIAGNOSIS — M25.661 DECREASED RANGE OF MOTION OF RIGHT LOWER EXTREMITY: ICD-10-CM

## 2023-04-04 DIAGNOSIS — Z96.641 S/P TOTAL RIGHT HIP ARTHROPLASTY: Primary | ICD-10-CM

## 2023-04-04 DIAGNOSIS — R26.9 GAIT DISTURBANCE: ICD-10-CM

## 2023-04-04 DIAGNOSIS — M25.551 ACUTE POSTOPERATIVE PAIN OF RIGHT HIP: ICD-10-CM

## 2023-04-04 PROCEDURE — 97161 PT EVAL LOW COMPLEX 20 MIN: CPT

## 2023-04-04 NOTE — PLAN OF CARE
[facility]  Physical Therapy Initial Evaluation    Name: Javad Garcia  Clinic Number: [id number]    Therapy Diagnosis:   Encounter Diagnosis   Name Primary?    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    Medications:   Javad has a current medication list which includes the following prescription(s): cetirizine, cholecalciferol (vitamin d3), duloxetine, fluticasone propionate, lisinopril, lisinopril-hydrochlorothiazide, lovastatin, meloxicam, morphine, norco, paliperidone, pantoprazole, trazodone, and triamcinolone acetonide 0.1%.    Allergies:   Review of patient's allergies indicates:   Allergen Reactions    Oxycodone-acetaminophen      Other reaction(s): Unknown        Imaging, xrays    Prior Therapy: none for this diagnosis  Social History: single lives with a friend  Occupation: not working prior to surgery  Prior Level of Function:  independent   Current Level of Function: requires assistance with most activiites of daily living at present    Pain:  Current 7/10, worst 9/10, best 5/10   Location: right hip   Description: Aching, Throbbing, Sharp, Shooting, and Variable  Aggravating Factors: Sitting, Walking, and Night Time  Easing Factors: pain medication and ice    Pts goals: be able to walk without assistive device or pain in right hip    Objective     Incisions: covered by dressing - no drainage - patient states it was just changed and there was no drainage noted -     Comments:      Range of motion:  Motion Right Left    Hip flexion   55 degrees   WITHIN FUNCTIONAL LIMITS   Hip extension   0 degrees   WITHIN FUNCTIONAL LIMITS   Hip abduction   30 degrees   WITHIN FUNCTIONAL LIMITS   Hip adduction   neutral  WITHIN FUNCTIONAL LIMITS   Internal rotation   Not tested  WITHIN FUNCTIONAL LIMITS   External rotation   20 degrees   WITHIN FUNCTIONAL LIMITS       Manual muscle test   Muscle Right  Left    Hip flexion NT  MMT strength: 5/5   Hip extension NT  MMT strength: 5/5   Hip abduction NT  MMT strength: 5/5   Hip adduction NT  MMT strength: 5/5   Hip internal rotation NT  MMT strength: 5/5   Hip external rotation NT  MMT strength: 5/5   Knee extension  MMT strength: 4-/5  MMT strength: 5/5   Knee flexion  MMT strength: 4-/5  MMT strength: 5/5   Ankle DF  MMT strength: 4/5  MMT strength: 5/5   Ankle PF  MMT strength: 4/5  MMT strength: 5/5   Ankle inversion  MMT strength: 4/5  MMT strength: 5/5   Ankle eversion  MMT strength: 4/5  MMT strength: 5/5       Gait:  Weight bearing precautions: WBAT  Assistive device: rolling walker  Ambulation distance and deviations:  Stairs: has 2 steps in back of house he is using due to multiple steps in the front  Comments:      Clinical Special Tests:  A. Hip - not performed secondary to patient is postop right total hip replacement      Comments:    Limitation/Restriction for FOTO Hip Survey    Therapist  reviewed FOTO scores for Javad Garcia on 4/4/2023.   FOTO documents entered into [employer] - see Media section.    Intake Score: 22         TREATMENT     Total Treatment time (time-based codes) separate from Evaluation: 10 minutes    Javad received the treatments listed below:  Supine hip abduction, seated long arc quad, heel slides, calf raises, quad sets; gait trained on stairs    Home Exercises and Patient Education Provided    Education provided:   - evaluation results, plan of care, home exercise program, and review of hip precautions    Written Home Exercises Provided: yes.  Exercises were reviewed and Javad was able to demonstrate them prior to the end of the session.  Javad demonstrated good  understanding of the education provided.     See EMR under Patient Instructions for exercises provided 4/4/2023.    Assessment   Javad is a 53 y.o. male referred to outpatient Physical Therapy with a medical diagnosis of s/p right total hip replacement. Pt presents with typical postop symptoms of decreased hip range of motion, gait disturbance, right lower extremity weakness, pain, and overall decrease in functional mobility. Patient was re-educated on total hip precautions. Patient is requiring assistance with all activiites of daily living except getting up and down from a chair and on and off the toilet.     Pt prognosis is Good.   Pt will benefit from skilled outpatient Physical Therapy to address the deficits stated above and in the chart below, provide pt/family education, and to maximize pt's level of independence.     Plan of care discussed with patient: Yes  Pt's spiritual, cultural and educational needs considered and patient is agreeable to the plan of care and goals as stated below:     Anticipated Barriers for therapy: none      Goals:  Patient will be independent with home exercise program to facilitate carryover between visits.  Patient will be able to do a straight leg raise to allow him to get in and out of the  bed independently.  Patient will be able to ambulate independently without assistive device for functional independence.   Patient will be able to go up and down 6 steps reciprocally without handrails to be able to get in and out of the front of his house independently.  Patient will report decrease in right hip pain to </= 1/10 for improved quality of life.  Patient will have >/= to 4+/5 strength in right hip/lower extremity for improved stability with gait and activiites of daily living.  Patient will be able to step over edge of tub to allow him to shower independently.  Patient will be able to dress and drive independently.    Plan   Plan of care Certification: 4/4/2023 to 6/2/2023.    Outpatient Physical Therapy 2 times weekly for 8 weeks to include the following interventions: Electrical Stimulation IFC/premod as needed, Gait Training, Manual Therapy, Moist Heat/ Ice, Neuromuscular Re-ed, Patient Education, Therapeutic Activities, Therapeutic Exercise, and Ultrasound.     MURIEL SHAH, PT

## 2023-04-08 ENCOUNTER — HOSPITAL ENCOUNTER (EMERGENCY)
Facility: HOSPITAL | Age: 53
Discharge: HOME | End: 2023-04-08
Attending: EMERGENCY MEDICINE
Payer: MEDICAID

## 2023-04-08 VITALS
RESPIRATION RATE: 18 BRPM | DIASTOLIC BLOOD PRESSURE: 64 MMHG | TEMPERATURE: 98 F | OXYGEN SATURATION: 99 % | HEIGHT: 69 IN | SYSTOLIC BLOOD PRESSURE: 116 MMHG | HEART RATE: 97 BPM | BODY MASS INDEX: 31.7 KG/M2 | WEIGHT: 214 LBS

## 2023-04-08 DIAGNOSIS — Z48.89 ENCOUNTER FOR POST SURGICAL WOUND CHECK: Primary | ICD-10-CM

## 2023-04-08 LAB
ANION GAP SERPL CALCULATED.3IONS-SCNC: 11 MMOL/L (ref 7–16)
BASOPHILS # BLD AUTO: 0.04 K/UL (ref 0–0.2)
BASOPHILS NFR BLD AUTO: 0.4 % (ref 0–1)
BUN SERPL-MCNC: 17 MG/DL (ref 7–18)
BUN/CREAT SERPL: 16 (ref 6–20)
CALCIUM SERPL-MCNC: 9.1 MG/DL (ref 8.5–10.1)
CHLORIDE SERPL-SCNC: 100 MMOL/L (ref 98–107)
CO2 SERPL-SCNC: 30 MMOL/L (ref 21–32)
CREAT SERPL-MCNC: 1.07 MG/DL (ref 0.7–1.3)
DIFFERENTIAL METHOD BLD: ABNORMAL
EGFR (NO RACE VARIABLE) (RUSH/TITUS): 82 ML/MIN/1.73M²
EOSINOPHIL # BLD AUTO: 0.2 K/UL (ref 0–0.5)
EOSINOPHIL NFR BLD AUTO: 2 % (ref 1–4)
ERYTHROCYTE [DISTWIDTH] IN BLOOD BY AUTOMATED COUNT: 12 % (ref 11.5–14.5)
GLUCOSE SERPL-MCNC: 104 MG/DL (ref 74–106)
HCT VFR BLD AUTO: 34.7 % (ref 40–54)
HGB BLD-MCNC: 11.1 G/DL (ref 13.5–18)
IMM GRANULOCYTES # BLD AUTO: 0.08 K/UL (ref 0–0.04)
IMM GRANULOCYTES NFR BLD: 0.8 % (ref 0–0.4)
LYMPHOCYTES # BLD AUTO: 1.7 K/UL (ref 1–4.8)
LYMPHOCYTES NFR BLD AUTO: 16.7 % (ref 27–41)
MCH RBC QN AUTO: 30.6 PG (ref 27–31)
MCHC RBC AUTO-ENTMCNC: 32 G/DL (ref 32–36)
MCV RBC AUTO: 95.6 FL (ref 80–96)
MONOCYTES # BLD AUTO: 0.81 K/UL (ref 0–0.8)
MONOCYTES NFR BLD AUTO: 8 % (ref 2–6)
MPC BLD CALC-MCNC: 9.3 FL (ref 9.4–12.4)
NEUTROPHILS # BLD AUTO: 7.32 K/UL (ref 1.8–7.7)
NEUTROPHILS NFR BLD AUTO: 72.1 % (ref 53–65)
NRBC # BLD AUTO: 0 X10E3/UL
NRBC, AUTO (.00): 0 %
PLATELET # BLD AUTO: 404 K/UL (ref 150–400)
POTASSIUM SERPL-SCNC: 3.8 MMOL/L (ref 3.5–5.1)
RBC # BLD AUTO: 3.63 M/UL (ref 4.6–6.2)
SODIUM SERPL-SCNC: 137 MMOL/L (ref 136–145)
WBC # BLD AUTO: 10.15 K/UL (ref 4.5–11)

## 2023-04-08 PROCEDURE — 99283 EMERGENCY DEPT VISIT LOW MDM: CPT | Mod: ,,, | Performed by: EMERGENCY MEDICINE

## 2023-04-08 PROCEDURE — 99283 EMERGENCY DEPT VISIT LOW MDM: CPT

## 2023-04-08 PROCEDURE — 99283 PR EMERGENCY DEPT VISIT,LEVEL III: ICD-10-PCS | Mod: ,,, | Performed by: EMERGENCY MEDICINE

## 2023-04-08 PROCEDURE — 80048 BASIC METABOLIC PNL TOTAL CA: CPT | Performed by: EMERGENCY MEDICINE

## 2023-04-08 PROCEDURE — 85025 COMPLETE CBC W/AUTO DIFF WBC: CPT | Performed by: EMERGENCY MEDICINE

## 2023-04-09 NOTE — ED TRIAGE NOTES
Pt in with cc of drainage from his hip replacement on 3/27 by dr jefferson at [facility]

## 2023-04-09 NOTE — ED PROVIDER NOTES
Encounter Date: 4/8/2023       History     Chief Complaint   Patient presents with    Post-op Problem     Right hip replacement      54 Y/O MALE 10 - 12 DAYS POST-OP RIGHT HIP SURGERY PRESENTS WITH DRAINAGE FROM WOUND.  HE SAYS HE HAS BEEN ONLINE AND IS WORRIED THAT HE IS GETTING SEPTIC.  HE SAYS DRAINAGE WAS BLUISH-GREEN.  CURRENT DRESSING IS CLEAN AND WOUND IS WITHOUT SIGNS OF INFECTION OR INFLAMMATION.      Review of patient's allergies indicates:   Allergen Reactions    Oxycodone-acetaminophen      Other reaction(s): Unknown     Past Medical History:   Diagnosis Date    Hypertension     Status post hip surgery      Past Surgical History:   Procedure Laterality Date    HERNIA REPAIR      SHOULDER ARTHROSCOPY Left 3/22/2022    Procedure: ARTHROSCOPY, SHOULDER;  Surgeon: Nii Johnson MD;  Location: [facility];  Service: Orthopedics;  Laterality: Left;    WRIST SURGERY       History reviewed. No pertinent family history.  Social History     Tobacco Use    Smoking status: Former    Smokeless tobacco: Current     Types: Snuff   Substance Use Topics    Alcohol use: Not Currently    Drug use: Never     Review of Systems   All other systems reviewed and are negative.    Physical Exam     Initial Vitals [04/08/23 1955]   BP Pulse Resp Temp SpO2   116/64 97 18 98.2 °F (36.8 °C) 99 %      MAP       --         Physical Exam    Nursing note and vitals reviewed.  Constitutional: He appears well-developed and well-nourished.   HENT:   Head: Normocephalic and atraumatic.   Nose: Nose normal.   Mouth/Throat: Oropharynx is clear and moist.   Eyes: Conjunctivae and EOM are normal. Pupils are equal, round, and reactive to light.   Neck: Neck supple.   Normal range of motion.  Cardiovascular:  Normal rate, regular rhythm and normal heart sounds.           Pulmonary/Chest: Breath sounds normal.   Abdominal: Abdomen is soft. Bowel sounds are normal.   Musculoskeletal:         General: Normal range of motion.       Cervical back: Normal range of motion and neck supple.     Neurological: He is alert and oriented to person, place, and time. He has normal strength. GCS score is 15. GCS eye subscore is 4. GCS verbal subscore is 5. GCS motor subscore is 6.   Skin: Capillary refill takes less than 2 seconds.   STAPLE LINE INTACT WITHOUT SIGNS OF INFECTION OR INFLAMMATION.     Psychiatric: He has a normal mood and affect.       Medical Screening Exam   See Full Note    ED Course   Procedures  Labs Reviewed   CBC WITH DIFFERENTIAL - Abnormal; Notable for the following components:       Result Value    RBC 3.63 (*)     Hemoglobin 11.1 (*)     Hematocrit 34.7 (*)     Platelet Count 404 (*)     MPV 9.3 (*)     Neutrophils % 72.1 (*)     Lymphocytes % 16.7 (*)     Monocytes % 8.0 (*)     Immature Granulocytes % 0.8 (*)     Monocytes, Absolute 0.81 (*)     Immature Granulocytes, Absolute 0.08 (*)     All other components within normal limits   BASIC METABOLIC PANEL - Normal   CBC W/ AUTO DIFFERENTIAL    Narrative:     The following orders were created for panel order CBC auto differential.  Procedure                               Abnormality         Status                     ---------                               -----------         ------                     CBC with Differential[[id number]]        Abnormal            Final result                 Please view results for these tests on the individual orders.          Imaging Results    None          Medications - No data to display                    Clinical Impression:   Final diagnoses:  [Z48.89] Encounter for post surgical wound check (Primary)        ED Disposition Condition    Discharge Stable          ED Prescriptions    None       Follow-up Information       Follow up With Specialties Details Why Contact Info    PRIMARY CARE PROVIDER   As needed              Sebastian Landers MD  04/08/23 [id number]

## 2023-04-17 ENCOUNTER — CLINICAL SUPPORT (OUTPATIENT)
Dept: REHABILITATION | Facility: HOSPITAL | Age: 53
End: 2023-04-17
Payer: MEDICAID

## 2023-04-17 DIAGNOSIS — Z96.641 S/P TOTAL RIGHT HIP ARTHROPLASTY: Primary | ICD-10-CM

## 2023-04-17 DIAGNOSIS — M25.661 DECREASED RANGE OF MOTION OF RIGHT LOWER EXTREMITY: ICD-10-CM

## 2023-04-17 DIAGNOSIS — M25.551 ACUTE POSTOPERATIVE PAIN OF RIGHT HIP: ICD-10-CM

## 2023-04-17 DIAGNOSIS — G89.18 ACUTE POSTOPERATIVE PAIN OF RIGHT HIP: ICD-10-CM

## 2023-04-17 DIAGNOSIS — R26.9 GAIT DISTURBANCE: ICD-10-CM

## 2023-04-17 PROCEDURE — 97110 THERAPEUTIC EXERCISES: CPT

## 2023-04-17 PROCEDURE — 97112 NEUROMUSCULAR REEDUCATION: CPT

## 2023-04-17 PROCEDURE — 97140 MANUAL THERAPY 1/> REGIONS: CPT

## 2023-04-17 NOTE — PROGRESS NOTES
Physical Therapy Treatment Note     Name: Javad Garcia  Clinic Number: [id number]    Therapy Diagnosis:        Encounter Diagnosis   Name Primary?    S/P total right hip arthroplasty      Physician: Earl Baires MD    Visit Date: 4/17/2023    Physician Orders: PT Eval and Treat   Medical Diagnosis from Referral: see above  Evaluation Date: 4/4/2023  Authorization Period Expiration: 6/2/2023   Plan of Care Expiration: 6/2/2023     Visit # / Visits authorized: 2/17     PTA Visit #:     Time In: 7:46 am  Time Out: 8:32 am  Total Billable Time: 46 minutes    Precautions: Standard and total hip  Functional Level Prior to Evaluation: independent     Subjective     Pt reports: he is moving a little slow today - says his knee is bothering him some.  He was compliant with home exercise program.  Response to previous treatment: no complaints from evaluation   Functional change: walking some without walker in home    Pain: 2/10  Location: right hip      Objective     Javad received therapeutic exercises to develop strength, endurance, ROM, and flexibility for 9 minutes including:  Nu Step x 6 minutes  Long arc quad 2 x 10    Javad received the following manual therapy techniques: passive range of motion was applied to the: right hip for 10 minutes, including:  Manual hip flexion, abduction/adduction, and hip extension range of motion     Javad participated in neuromuscular re-education activities to improve: Balance, Coordination, Proprioception, quad control and hip stability for 24 minutes. The following activities were included:    Bridging 2 x 10   Hooklying hip abduction - green 2 x 10  Hooklying hip adduction squeezes - 3 second hold 2 x 10  Straight leg raise w/ minimal assistance 3 x 5  Sidelying hip abduction w/ minimal assistance 3 x 5  Single leg stance at counter - right 10 second hold x 5    Javad participated in dynamic functional therapeutic activities to improve functional performance for 0  minutes,  including:      Javad participated in gait training to improve functional mobility and safety for 3  minutes, including:  Ambulating a lap around gym with focus on keeping toes pointing forward and heel/toe pattern -     Javad received the following direct contact modalities after being cleared for contraindications:     Javad received the following supervised modalities after being cleared for contradictions:     Javad received hot pack for 0 minutes to     Javad received cold pack for 0 minutes to       Home Exercises Provided and Patient Education Provided     Education provided: reviewed total hip precautions    Written Home Exercises Provided: Patient instructed to cont prior HEP.  Exercises were reviewed and Jaavd was able to demonstrate them prior to the end of the session.  Javad demonstrated good  understanding of the education provided.     See EMR under Patient Instructions for exercises provided prior visit.    Assessment   Evaluation assessment:  Javad is a 53 y.o. male referred to outpatient Physical Therapy with a medical diagnosis of s/p right total hip replacement. Pt presents with typical postop symptoms of decreased hip range of motion, gait disturbance, right lower extremity weakness, pain, and overall decrease in functional mobility. Patient was re-educated on total hip precautions. Patient is requiring assistance with all activiites of daily living except getting up and down from a chair and on and off the toilet.     Current assessment:  Javad arrived for his first visit after evaluation. He stated the hip is stiff but not hurting a lot. He says his knee is actually bothering him more than the hip this morning. His gait was very antalgic to start but got smoother as he moved around. By the end of treatment he was able to walk a lap around the gym without his walker. He completed the above listed exercises but did require some assistance with sidelying hip abduction and straight leg raise. Will progress  patient as he is able adhering to total hip precautions.    Javad Is progressing well towards his goals.   Pt prognosis is Good.     Pt will continue to benefit from skilled outpatient physical therapy to address the deficits listed in the problem list box on initial evaluation, provide pt/family education and to maximize pt's level of independence in the home and community environment.     Pt's spiritual, cultural and educational needs considered and pt agreeable to plan of care and goals.     Anticipated barriers to physical therapy: none    Goals:  Patient will be independent with home exercise program to facilitate carryover between visits.  Patient will be able to do a straight leg raise to allow him to get in and out of the bed independently.  Patient will be able to ambulate independently without assistive device for functional independence.   Patient will be able to go up and down 6 steps reciprocally without handrails to be able to get in and out of the front of his house independently.  Patient will report decrease in right hip pain to </= 1/10 for improved quality of life.  Patient will have >/= to 4+/5 strength in right hip/lower extremity for improved stability with gait and activiites of daily living.  Patient will be able to step over edge of tub to allow him to shower independently.  Patient will be able to dress and drive independently.    Plan     Plan of care Certification: 4/4/2023 to 6/2/2023.     Outpatient Physical Therapy 2 times weekly for 8 weeks to include the following interventions: Electrical Stimulation IFC/premod as needed, Gait Training, Manual Therapy, Moist Heat/ Ice, Neuromuscular Re-ed, Patient Education, Therapeutic Activities, Therapeutic Exercise, and Ultrasound.        MURIEL SHAH, PT  4/17/2023

## 2023-04-18 PROBLEM — R26.9 GAIT DISTURBANCE: Status: ACTIVE | Noted: 2023-04-18

## 2023-04-18 PROBLEM — G89.18 ACUTE POSTOPERATIVE PAIN OF RIGHT HIP: Status: ACTIVE | Noted: 2023-04-18

## 2023-04-18 PROBLEM — M25.551 ACUTE POSTOPERATIVE PAIN OF RIGHT HIP: Status: ACTIVE | Noted: 2023-04-18

## 2023-04-18 PROBLEM — M25.661 DECREASED RANGE OF MOTION OF RIGHT LOWER EXTREMITY: Status: ACTIVE | Noted: 2023-04-18

## 2023-04-18 PROBLEM — Z96.641 S/P TOTAL RIGHT HIP ARTHROPLASTY: Status: ACTIVE | Noted: 2023-04-18

## 2023-04-19 ENCOUNTER — CLINICAL SUPPORT (OUTPATIENT)
Dept: REHABILITATION | Facility: HOSPITAL | Age: 53
End: 2023-04-19
Payer: MEDICAID

## 2023-04-19 DIAGNOSIS — R26.9 GAIT DISTURBANCE: Primary | ICD-10-CM

## 2023-04-19 DIAGNOSIS — Z96.641 S/P TOTAL RIGHT HIP ARTHROPLASTY: ICD-10-CM

## 2023-04-19 PROCEDURE — 97140 MANUAL THERAPY 1/> REGIONS: CPT | Mod: CQ

## 2023-04-19 PROCEDURE — 97112 NEUROMUSCULAR REEDUCATION: CPT | Mod: CQ

## 2023-04-19 PROCEDURE — 97110 THERAPEUTIC EXERCISES: CPT | Mod: CQ

## 2023-04-19 NOTE — PROGRESS NOTES
Physical Therapy Treatment Note     Name: Javad Garcia  Clinic Number: [id number]    Therapy Diagnosis:        Encounter Diagnosis   Name Primary?    S/P total right hip arthroplasty      Physician: Earl Baires MD    Visit Date: 4/19/2023    Physician Orders: PT Eval and Treat   Medical Diagnosis from Referral: see above  Evaluation Date: 4/4/2023  Authorization Period Expiration: 6/2/2023   Plan of Care Expiration: 6/2/2023     Visit # / Visits authorized: 3/17     PTA Visit #: 1    Time In: 7:45 am  Time Out: 8:26 am  Total Billable Time: 41 minutes    Precautions: Standard and total hip  Functional Level Prior to Evaluation: independent     Subjective     Pt reports: he is sleeping some but wakes himself with sudden movements that hurt sometimes.  He was compliant with home exercise program.  Response to previous treatment: he was pretty sore   Functional change: walking some without walker in home    Pain: 2/10  Location: right hip      Objective     Case conference with Miriam Hunter PT, for initial PTA visit.     Javad received therapeutic exercises to develop strength, endurance, ROM, and flexibility for 9 minutes including:  Nu Step x 6 minutes  Long arc quad 3 x 10    Javad received the following manual therapy techniques: passive range of motion was applied to the: right hip for 8 minutes, including:  Manual hip flexion, abduction/adduction, and hip extension range of motion     Javad participated in neuromuscular re-education activities to improve: Balance, Coordination, Proprioception, quad control and hip stability for 21 minutes. The following activities were included:  Bridging 3 x 10   Hooklying hip abduction - green 2 x 10  Hooklying hip adduction squeezes - 3 second hold 2 x 10  Straight leg raise w/ minimal assistance 3 x 5  Supine hip abduction slides w/ minimal assistance 2 x 10  Supine heelslides for flexion/extension x 20  Single leg stance at counter - right 10 second hold x 10    Javad  participated in dynamic functional therapeutic activities to improve functional performance for 0  minutes, including:      Javad participated in gait training to improve functional mobility and safety for 3 minutes, including:  Ambulating a lap around gym with focus on keeping toes pointing forward and heel/toe pattern -     Javad received the following direct contact modalities after being cleared for contraindications:     Javad received the following supervised modalities after being cleared for contradictions:       Home Exercises Provided and Patient Education Provided     Education provided: reviewed total hip precautions    Written Home Exercises Provided: Patient instructed to cont prior HEP.  Exercises were reviewed and Javad was able to demonstrate them prior to the end of the session.  Javad demonstrated good  understanding of the education provided.     See EMR under Patient Instructions for exercises provided prior visit.    Assessment   Evaluation assessment:  Javad is a 53 y.o. male referred to outpatient Physical Therapy with a medical diagnosis of s/p right total hip replacement. Pt presents with typical postop symptoms of decreased hip range of motion, gait disturbance, right lower extremity weakness, pain, and overall decrease in functional mobility. Patient was re-educated on total hip precautions. Patient is requiring assistance with all activiites of daily living except getting up and down from a chair and on and off the toilet.     Current assessment:  Javad arrived for his second visit after evaluation. He stated the hip was sore and stiff but not hurting a lot. He had difficulty getting his foot onto NuStep as well as lifting it on to mat. Straight leg raises were challenging but by end of treatment he had loosened up and was able to move more independently. Will progress patient as he is able adhering to total hip precautions.    Javad Is progressing well towards his goals.   Pt prognosis is Good.      Pt will continue to benefit from skilled outpatient physical therapy to address the deficits listed in the problem list box on initial evaluation, provide pt/family education and to maximize pt's level of independence in the home and community environment.     Pt's spiritual, cultural and educational needs considered and pt agreeable to plan of care and goals.     Anticipated barriers to physical therapy: none    Goals:  Patient will be independent with home exercise program to facilitate carryover between visits.  Patient will be able to do a straight leg raise to allow him to get in and out of the bed independently.  Patient will be able to ambulate independently without assistive device for functional independence.   Patient will be able to go up and down 6 steps reciprocally without handrails to be able to get in and out of the front of his house independently.  Patient will report decrease in right hip pain to </= 1/10 for improved quality of life.  Patient will have >/= to 4+/5 strength in right hip/lower extremity for improved stability with gait and activiites of daily living.  Patient will be able to step over edge of tub to allow him to shower independently.  Patient will be able to dress and drive independently.    Plan     Plan of care Certification: 4/4/2023 to 6/2/2023.     Outpatient Physical Therapy 2 times weekly for 8 weeks to include the following interventions: Electrical Stimulation IFC/premod as needed, Gait Training, Manual Therapy, Moist Heat/ Ice, Neuromuscular Re-ed, Patient Education, Therapeutic Activities, Therapeutic Exercise, and Ultrasound.        Kaylee Albert, PTA  4/19/2023

## 2023-04-25 ENCOUNTER — CLINICAL SUPPORT (OUTPATIENT)
Dept: REHABILITATION | Facility: HOSPITAL | Age: 53
End: 2023-04-25
Payer: MEDICAID

## 2023-04-25 DIAGNOSIS — Z96.641 S/P TOTAL RIGHT HIP ARTHROPLASTY: Primary | ICD-10-CM

## 2023-04-25 PROCEDURE — 97140 MANUAL THERAPY 1/> REGIONS: CPT | Mod: CQ

## 2023-04-25 PROCEDURE — 97530 THERAPEUTIC ACTIVITIES: CPT | Mod: CQ

## 2023-04-25 PROCEDURE — 97112 NEUROMUSCULAR REEDUCATION: CPT | Mod: CQ

## 2023-04-25 NOTE — PROGRESS NOTES
Physical Therapy Treatment Note     Name: Javad Garcia  Clinic Number: [id number]    Therapy Diagnosis:        Encounter Diagnosis   Name Primary?    S/P total right hip arthroplasty      Physician: Earl Baires MD    Visit Date: 4/25/2023    Physician Orders: PT Eval and Treat   Medical Diagnosis from Referral: see above  Evaluation Date: 4/4/2023  Authorization Period Expiration: 6/2/2023   Plan of Care Expiration: 6/2/2023     Visit # / Visits authorized: 4/17     PTA Visit #: 2    Time In: 10:45 am  Time Out: 11:25 am  Total Billable Time: 38 minutes    Precautions: Standard and total hip  Functional Level Prior to Evaluation: independent     Subjective     Pt reports: he is sleeping some but wakes himself with sudden movements that hurt sometimes.  He was compliant with home exercise program.  Response to previous treatment: he was pretty sore   Functional change: walking some without walker in home    Pain: 2/10  Location: right hip      Objective     Javad received therapeutic exercises to develop strength, endurance, ROM, and flexibility for 8 minutes including:  Nu Step x 6 minutes  Clams (right) 3 x 5    Javad received the following manual therapy techniques: passive range of motion was applied to the: right hip for 10 minutes, including:  Manual hip flexion, abduction/adduction, and hip extension range of motion     Javad participated in neuromuscular re-education activities to improve: Balance, Coordination, Proprioception, quad control and hip stability for 11 minutes. The following activities were included:  Bridging 3 x 10   Hooklying hip abduction - green 3 x 10  Hooklying hip adduction squeezes - 3 second hold 3 x 10  Straight leg raise w/o assistance 2 x 5  Single leg stance at counter - right 10 second hold x 5    Javad participated in dynamic functional therapeutic activities to improve functional performance for 9 minutes, including:  Step touches to improve active hip flexion for community  ambulation x 30  Sidelying hip abduction 3 x 5  Hooklying march x 30    Javad participated in gait training to improve functional mobility and safety for 0 minutes, including:  Ambulating a lap around gym with focus on keeping toes pointing forward and heel/toe pattern -     Javad received the following direct contact modalities after being cleared for contraindications:     Javad received the following supervised modalities after being cleared for contradictions:       Home Exercises Provided and Patient Education Provided     Education provided: reviewed total hip precautions    Written Home Exercises Provided: Patient instructed to cont prior HEP.  Exercises were reviewed and Javad was able to demonstrate them prior to the end of the session.  Javad demonstrated good  understanding of the education provided.     See EMR under Patient Instructions for exercises provided prior visit.    Assessment   Evaluation assessment:  Javad is a 53 y.o. male referred to outpatient Physical Therapy with a medical diagnosis of s/p right total hip replacement. Pt presents with typical postop symptoms of decreased hip range of motion, gait disturbance, right lower extremity weakness, pain, and overall decrease in functional mobility. Patient was re-educated on total hip precautions. Patient is requiring assistance with all activiites of daily living except getting up and down from a chair and on and off the toilet.     Current assessment:  Javad reports he walked around the block yesterday. He had some muscle pain/soreness during/after his walk. He was able to perform straight leg raise without assist today. He is still tight and apprehensive with motion but is showing signs of improvement with strength. Will progress patient as he is able adhering to total hip precautions.    Javad Is progressing well towards his goals.   Pt prognosis is Good.     Pt will continue to benefit from skilled outpatient physical therapy to address the  deficits listed in the problem list box on initial evaluation, provide pt/family education and to maximize pt's level of independence in the home and community environment.     Pt's spiritual, cultural and educational needs considered and pt agreeable to plan of care and goals.     Anticipated barriers to physical therapy: none    Goals:  Patient will be independent with home exercise program to facilitate carryover between visits.  Patient will be able to do a straight leg raise to allow him to get in and out of the bed independently.  Patient will be able to ambulate independently without assistive device for functional independence.   Patient will be able to go up and down 6 steps reciprocally without handrails to be able to get in and out of the front of his house independently.  Patient will report decrease in right hip pain to </= 1/10 for improved quality of life.  Patient will have >/= to 4+/5 strength in right hip/lower extremity for improved stability with gait and activiites of daily living.  Patient will be able to step over edge of tub to allow him to shower independently.  Patient will be able to dress and drive independently.    Plan     Plan of care Certification: 4/4/2023 to 6/2/2023.     Outpatient Physical Therapy 2 times weekly for 8 weeks to include the following interventions: Electrical Stimulation IFC/premod as needed, Gait Training, Manual Therapy, Moist Heat/ Ice, Neuromuscular Re-ed, Patient Education, Therapeutic Activities, Therapeutic Exercise, and Ultrasound.        Kaylee Albert, PTA  4/25/2023

## 2023-05-01 ENCOUNTER — CLINICAL SUPPORT (OUTPATIENT)
Dept: REHABILITATION | Facility: HOSPITAL | Age: 53
End: 2023-05-01
Payer: MEDICAID

## 2023-05-01 DIAGNOSIS — Z96.641 S/P TOTAL RIGHT HIP ARTHROPLASTY: Primary | ICD-10-CM

## 2023-05-01 PROCEDURE — 97140 MANUAL THERAPY 1/> REGIONS: CPT | Mod: CQ

## 2023-05-01 PROCEDURE — 97112 NEUROMUSCULAR REEDUCATION: CPT | Mod: CQ

## 2023-05-01 PROCEDURE — 97530 THERAPEUTIC ACTIVITIES: CPT | Mod: CQ

## 2023-05-01 NOTE — PROGRESS NOTES
Physical Therapy Treatment Note     Name: Javad Garcia  Clinic Number: [id number]    Therapy Diagnosis:        Encounter Diagnosis   Name Primary?    S/P total right hip arthroplasty      Physician: Earl Baires MD    Visit Date: 5/1/2023    Physician Orders: PT Eval and Treat   Medical Diagnosis from Referral: see above  Evaluation Date: 4/4/2023  Authorization Period Expiration: 6/2/2023   Plan of Care Expiration: 6/2/2023     Visit # / Visits authorized: 5/17     PTA Visit #: 3/5    Time In: 10:00 am  Time Out: 10:38 am  Total Billable Time: 38 minutes    Precautions: Standard and total hip  Functional Level Prior to Evaluation: independent     Subjective     Pt reports: he was having a lot of pain.  He was compliant with home exercise program.  Response to previous treatment: he was pretty sore   Functional change: walking some without walker in home    Pain: 2/10  Location: right hip      Objective     Javad received therapeutic exercises to develop strength, endurance, ROM, and flexibility for 8 minutes including:  Nu Step x 6 minutes  Clams (right) 3 x 5    Javad received the following manual therapy techniques: passive range of motion was applied to the: right hip for 10 minutes, including:  Manual hip flexion, abduction/adduction, and hip extension range of motion     Javad participated in neuromuscular re-education activities to improve: Balance, Coordination, Proprioception, quad control and hip stability for 11 minutes. The following activities were included:  Bridging 3 x 10   Hooklying hip abduction - green 3 x 10  Hooklying hip adduction squeezes - 3 second hold 3 x 10  Straight leg raise w/o assistance 2 x 5  Single leg stance at counter - right 10 second hold x 5    Javad participated in dynamic functional therapeutic activities to improve functional performance for 9 minutes, including:  Step touches to improve active hip flexion for community ambulation x 30  Sidelying hip abduction 3 x  5  Hooklying march x 30    Javad participated in gait training to improve functional mobility and safety for 0 minutes, including:  Ambulating a lap around gym with focus on keeping toes pointing forward and heel/toe pattern -     Javad received the following direct contact modalities after being cleared for contraindications:     Javad received the following supervised modalities after being cleared for contradictions:       Home Exercises Provided and Patient Education Provided     Education provided: reviewed total hip precautions    Written Home Exercises Provided: Patient instructed to cont prior HEP.  Exercises were reviewed and Javad was able to demonstrate them prior to the end of the session.  Javad demonstrated good  understanding of the education provided.     See EMR under Patient Instructions for exercises provided prior visit.    Assessment   Evaluation assessment:  Javad is a 53 y.o. male referred to outpatient Physical Therapy with a medical diagnosis of s/p right total hip replacement. Pt presents with typical postop symptoms of decreased hip range of motion, gait disturbance, right lower extremity weakness, pain, and overall decrease in functional mobility. Patient was re-educated on total hip precautions. Patient is requiring assistance with all activiites of daily living except getting up and down from a chair and on and off the toilet.     Current assessment:  Patient states that he is tight in the front of his hips. Patient is able to perform straight leg raise without assist today. He is still tight and apprehensive with motion but is showing signs of improvement with strength. Will progress patient as he is able adhering to total hip precautions.    Javad Is progressing well towards his goals.   Pt prognosis is Good.     Pt will continue to benefit from skilled outpatient physical therapy to address the deficits listed in the problem list box on initial evaluation, provide pt/family education and to  maximize pt's level of independence in the home and community environment.     Pt's spiritual, cultural and educational needs considered and pt agreeable to plan of care and goals.     Anticipated barriers to physical therapy: none    Goals:  Patient will be independent with home exercise program to facilitate carryover between visits.  Patient will be able to do a straight leg raise to allow him to get in and out of the bed independently.  Patient will be able to ambulate independently without assistive device for functional independence.   Patient will be able to go up and down 6 steps reciprocally without handrails to be able to get in and out of the front of his house independently.  Patient will report decrease in right hip pain to </= 1/10 for improved quality of life.  Patient will have >/= to 4+/5 strength in right hip/lower extremity for improved stability with gait and activiites of daily living.  Patient will be able to step over edge of tub to allow him to shower independently.  Patient will be able to dress and drive independently.    Plan     Plan of care Certification: 4/4/2023 to 6/2/2023.     Outpatient Physical Therapy 2 times weekly for 8 weeks to include the following interventions: Electrical Stimulation IFC/premod as needed, Gait Training, Manual Therapy, Moist Heat/ Ice, Neuromuscular Re-ed, Patient Education, Therapeutic Activities, Therapeutic Exercise, and Ultrasound.        Mirella Gonzalez, PTA  5/1/2023

## 2023-05-03 ENCOUNTER — CLINICAL SUPPORT (OUTPATIENT)
Dept: REHABILITATION | Facility: HOSPITAL | Age: 53
End: 2023-05-03
Payer: MEDICAID

## 2023-05-03 DIAGNOSIS — Z96.641 S/P TOTAL RIGHT HIP ARTHROPLASTY: Primary | ICD-10-CM

## 2023-05-03 PROCEDURE — 97116 GAIT TRAINING THERAPY: CPT | Mod: CQ

## 2023-05-03 PROCEDURE — 97110 THERAPEUTIC EXERCISES: CPT | Mod: CQ

## 2023-05-03 PROCEDURE — 97112 NEUROMUSCULAR REEDUCATION: CPT | Mod: CQ

## 2023-05-03 NOTE — PROGRESS NOTES
Physical Therapy Treatment Note     Name: Javad Garcia  Clinic Number: [id number]    Therapy Diagnosis:        Encounter Diagnosis   Name Primary?    S/P total right hip arthroplasty      Physician: Earl Baires MD    Visit Date: 5/3/2023    Physician Orders: PT Eval and Treat   Medical Diagnosis from Referral: see above  Evaluation Date: 4/4/2023  Authorization Period Expiration: 6/2/2023   Plan of Care Expiration: 6/2/2023     Visit # / Visits authorized: 6/17     PTA Visit #: 4/5    Time In: 10:08 am  Time Out: 10:50 am  Total Billable Time: 42 minutes    Precautions: Standard and total hip  Functional Level Prior to Evaluation: independent     Subjective     Pt reports:went for a walk already today, feels stiff; took pain med about one hour ago  He was compliant with home exercise program.  Response to previous treatment: he was pretty sore   Functional change: walking some without walker in home    Pain: 7-8/10  Location: right hip      Objective     Javad received therapeutic exercises to develop strength, endurance, ROM, and flexibility for 15 minutes including:  Bike  x 5 minutes  Clams (right) 3 x 5  Incline board GS x 10 with 3 sec hold  Hip flexor stretch 5 x 20 sec hold  LAQ 5# x 30  Standing hip flexion 5# x 20  Rail squats x 10    Javad received the following manual therapy techniques: passive range of motion was applied to the: right hip for 0 minutes, including:  Manual hip flexion, abduction/adduction, and hip extension range of motion     Javad participated in neuromuscular re-education activities to improve: Balance, Coordination, Proprioception, quad control and hip stability for 15 minutes. The following activities were included:  Bridging with green band 2 x 10   Hooklying hip abduction - green 0 x 10  Hooklying hip adduction squeezes with bridge - 3 second hold 2 x 10  Straight leg raise w/o assistance 2 x 5  Single leg stance at counter - right 10 second hold x 5  Prone GS x 10 with 3 sec  hold  Prone hip abduction  x 15  Prone hip extension x 15  Prone hip EXTERNAL ROTATION x 15    Javad participated in dynamic functional therapeutic activities to improve functional performance for 5 minutes, including:  Step touches to improve active hip flexion for community ambulation x 30  Sidelying hip abduction 2 x 10  Hooklying march x 30    Javad participated in gait training to improve functional mobility and safety for 8 minutes, including:  Ambulating a lap around gym with focus on keeping toes pointing forward and heel/toe pattern -   Increased step length on LLE  Javad received the following direct contact modalities after being cleared for contraindications:     Javad received the following supervised modalities after being cleared for contradictions:       Home Exercises Provided and Patient Education Provided     Education provided: reviewed total hip precautions    Written Home Exercises Provided: Patient instructed to cont prior HEP.  Exercises were reviewed and Javad was able to demonstrate them prior to the end of the session.  Javad demonstrated good  understanding of the education provided.     See EMR under Patient Instructions for exercises provided prior visit.    Assessment   Evaluation assessment:  Javad is a 53 y.o. male referred to outpatient Physical Therapy with a medical diagnosis of s/p right total hip replacement. Pt presents with typical postop symptoms of decreased hip range of motion, gait disturbance, right lower extremity weakness, pain, and overall decrease in functional mobility. Patient was re-educated on total hip precautions. Patient is requiring assistance with all activiites of daily living except getting up and down from a chair and on and off the toilet.     Current assessment:  PT initially with shorten stride but able to increaseCarl Is progressing well towards his goals.   Pt prognosis is Good.     Pt will continue to benefit from skilled outpatient physical therapy to  address the deficits listed in the problem list box on initial evaluation, provide pt/family education and to maximize pt's level of independence in the home and community environment.     Pt's spiritual, cultural and educational needs considered and pt agreeable to plan of care and goals.     Anticipated barriers to physical therapy: none    Goals:  Patient will be independent with home exercise program to facilitate carryover between visits.  Patient will be able to do a straight leg raise to allow him to get in and out of the bed independently.  Patient will be able to ambulate independently without assistive device for functional independence.   Patient will be able to go up and down 6 steps reciprocally without handrails to be able to get in and out of the front of his house independently.  Patient will report decrease in right hip pain to </= 1/10 for improved quality of life.  Patient will have >/= to 4+/5 strength in right hip/lower extremity for improved stability with gait and activiites of daily living.  Patient will be able to step over edge of tub to allow him to shower independently.  Patient will be able to dress and drive independently.    Plan     Plan of care Certification: 4/4/2023 to 6/2/2023.     Outpatient Physical Therapy 2 times weekly for 8 weeks to include the following interventions: Electrical Stimulation IFC/premod as needed, Gait Training, Manual Therapy, Moist Heat/ Ice, Neuromuscular Re-ed, Patient Education, Therapeutic Activities, Therapeutic Exercise, and Ultrasound.        Georgia Perea, PTA  5/3/2023

## 2023-05-08 ENCOUNTER — CLINICAL SUPPORT (OUTPATIENT)
Dept: REHABILITATION | Facility: HOSPITAL | Age: 53
End: 2023-05-08
Payer: MEDICAID

## 2023-05-08 DIAGNOSIS — M25.551 ACUTE POSTOPERATIVE PAIN OF RIGHT HIP: ICD-10-CM

## 2023-05-08 DIAGNOSIS — R26.9 GAIT DISTURBANCE: ICD-10-CM

## 2023-05-08 DIAGNOSIS — Z96.641 S/P TOTAL RIGHT HIP ARTHROPLASTY: Primary | ICD-10-CM

## 2023-05-08 DIAGNOSIS — G89.18 ACUTE POSTOPERATIVE PAIN OF RIGHT HIP: ICD-10-CM

## 2023-05-08 DIAGNOSIS — M25.661 DECREASED RANGE OF MOTION OF RIGHT LOWER EXTREMITY: ICD-10-CM

## 2023-05-08 PROCEDURE — 97110 THERAPEUTIC EXERCISES: CPT

## 2023-05-08 PROCEDURE — 97530 THERAPEUTIC ACTIVITIES: CPT

## 2023-05-08 PROCEDURE — 97140 MANUAL THERAPY 1/> REGIONS: CPT

## 2023-05-08 NOTE — PROGRESS NOTES
Physical Therapy Treatment Note     Name: Javad Garcia  Clinic Number: [id number]    Therapy Diagnosis:        Encounter Diagnosis   Name Primary?    S/P total right hip arthroplasty      Physician: Earl Baires MD    Visit Date: 5/8/2023    Physician Orders: PT Eval and Treat   Medical Diagnosis from Referral: see above  Evaluation Date: 4/4/2023  Authorization Period Expiration: 6/2/2023   Plan of Care Expiration: 6/2/2023     Visit # / Visits authorized: 7/17     PTA Visit #:     Time In: 10:55 am  Time Out: 11:45 am  Total Billable Time: 50 minutes    Precautions: Standard and total hip  Functional Level Prior to Evaluation: independent     Subjective     Pt reports: hip still stiff and muscles feel tight   He was compliant with home exercise program.  Response to previous treatment: he was pretty sore   Functional change: walking some without walker in home    Pain: 7-8/10  Location: right hip      Objective     Javad received therapeutic exercises to develop strength, endurance, ROM, and flexibility for 16 minutes including:  Bike x 5 minutes  Clams (right) 3 x 5  Incline board stretch x 2 minutes  Hip flexor stretch 4 x 20 sec hold (in right sidelying)  LAQ 5# x 30  Standing hip flexion 5#   Rail squats     Javad received the following manual therapy techniques: passive range of motion was applied to the: right hip for 10 minutes, including:  Manual hip flexion, abduction/adduction, and hip extension range of motion; foam rolling to glute med and ITB; attempted percussion gun but he could not tolerate    Javad participated in neuromuscular re-education activities to improve: Balance, Coordination, Proprioception, quad control and hip stability for 9 minutes. The following activities were included:  Bridging with abduction  - green band x 25   Hooklying hip adduction squeezes with bridge - 3 second hold   Straight leg raise w/o assistance 3 x 10  Single leg stance at counter - right   Prone GS   Prone hip  abduction    Prone hip extension x 15  Prone hip EXTERNAL ROTATION -      Javad participated in dynamic functional therapeutic activities to improve functional performance for 15 minutes, including:  Step touches to improve active hip flexion for community ambulation   Sidelying hip abduction 2 x 10  Hooklying march   Cybex hip abduction 1 plate x 20 each leg  Cybex hip flexion 1 plate x 20 each leg  Cybex hip extension 1 plate x 20 each leg    Javad participated in gait training to improve functional mobility and safety for 0 minutes, including:  Ambulating a lap around gym with focus on keeping toes pointing forward and heel/toe pattern -   Increased step length on LLE  Javad received the following direct contact modalities after being cleared for contraindications:     Javad received the following supervised modalities after being cleared for contradictions:       Home Exercises Provided and Patient Education Provided     Education provided: reviewed total hip precautions    Written Home Exercises Provided: Patient instructed to cont prior HEP.  Exercises were reviewed and Javad was able to demonstrate them prior to the end of the session.  Javad demonstrated good  understanding of the education provided.     See EMR under Patient Instructions for exercises provided prior visit.    Assessment   Evaluation assessment:  Javad is a 53 y.o. male referred to outpatient Physical Therapy with a medical diagnosis of s/p right total hip replacement. Pt presents with typical postop symptoms of decreased hip range of motion, gait disturbance, right lower extremity weakness, pain, and overall decrease in functional mobility. Patient was re-educated on total hip precautions. Patient is requiring assistance with all activiites of daily living except getting up and down from a chair and on and off the toilet.     Current assessment:  Javad continues to complain of muscular pain/tightness in right hip. He still finds clams and sidelying  hip abduction difficult. He could not tolerate the percussion gun but was able to tolerate foam rolling to right glute med and ITB. His gait was less antalgic today despite complaints of stiffness. Will continue to progress as tolerated staying within total hip precautions.    Javad Is progressing well towards his goals.   Pt prognosis is Good.     Pt will continue to benefit from skilled outpatient physical therapy to address the deficits listed in the problem list box on initial evaluation, provide pt/family education and to maximize pt's level of independence in the home and community environment.     Pt's spiritual, cultural and educational needs considered and pt agreeable to plan of care and goals.     Anticipated barriers to physical therapy: none    Goals:  Patient will be independent with home exercise program to facilitate carryover between visits.  Patient will be able to do a straight leg raise to allow him to get in and out of the bed independently.  Patient will be able to ambulate independently without assistive device for functional independence.   Patient will be able to go up and down 6 steps reciprocally without handrails to be able to get in and out of the front of his house independently.  Patient will report decrease in right hip pain to </= 1/10 for improved quality of life.  Patient will have >/= to 4+/5 strength in right hip/lower extremity for improved stability with gait and activiites of daily living.  Patient will be able to step over edge of tub to allow him to shower independently.  Patient will be able to dress and drive independently.    Plan     Plan of care Certification: 4/4/2023 to 6/2/2023.     Outpatient Physical Therapy 2 times weekly for 8 weeks to include the following interventions: Electrical Stimulation IFC/premod as needed, Gait Training, Manual Therapy, Moist Heat/ Ice, Neuromuscular Re-ed, Patient Education, Therapeutic Activities, Therapeutic Exercise, and Ultrasound.         MURIEL SHAH, PT  5/8/2023

## 2023-05-15 ENCOUNTER — CLINICAL SUPPORT (OUTPATIENT)
Dept: REHABILITATION | Facility: HOSPITAL | Age: 53
End: 2023-05-15
Payer: MEDICAID

## 2023-05-15 DIAGNOSIS — Z96.641 S/P TOTAL RIGHT HIP ARTHROPLASTY: Primary | ICD-10-CM

## 2023-05-15 PROCEDURE — 97530 THERAPEUTIC ACTIVITIES: CPT | Mod: CQ

## 2023-05-15 PROCEDURE — 97112 NEUROMUSCULAR REEDUCATION: CPT | Mod: CQ

## 2023-05-15 PROCEDURE — 97110 THERAPEUTIC EXERCISES: CPT | Mod: CQ

## 2023-05-15 NOTE — PROGRESS NOTES
Physical Therapy Treatment Note     Name: Javad Garcia  Clinic Number: [id number]    Therapy Diagnosis:        Encounter Diagnosis   Name Primary?    S/P total right hip arthroplasty      Physician: Earl Baires MD    Visit Date: 5/15/2023    Physician Orders: PT Eval and Treat   Medical Diagnosis from Referral: see above  Evaluation Date: 4/4/2023  Authorization Period Expiration: 6/2/2023   Plan of Care Expiration: 6/2/2023     Visit # / Visits authorized: 8/17     PTA Visit #: 1    Time In: 10:45 am  Time Out: 11:27 am  Total Billable Time: 40 minutes    Precautions: Standard and total hip  Functional Level Prior to Evaluation: independent     Subjective     Pt reports: he drove for the first time today.  He was compliant with home exercise program.  Response to previous treatment: he was pretty sore   Functional change: walking some without walker in home    Pain: 7-8/10  Location: right hip      Objective     Javad received therapeutic exercises to develop strength, endurance, ROM, and flexibility for 14 minutes including:  Bike x 5 minutes  Clams (right) 3 x 5  Incline board stretch x 2 minutes  Hip flexor stretch 4 x 20 sec hold (in right sidelying)  LAQ 5#   Rail squats x 20    Javad received the following manual therapy techniques: passive range of motion was applied to the: right hip for 5 minutes, including:  Manual hip flexion, abduction/adduction, and hip extension range of motion; foam rolling to glute med and ITB    Javad participated in neuromuscular re-education activities to improve: Balance, Coordination, Proprioception, quad control and hip stability for 9 minutes. The following activities were included:  Hooklying hip adduction squeezes with bridge - 3 second hold x 30  Straight leg raise w/o assistance 3 x 10  Single leg stance at counter - right but without hand hold - 10 x 10 second hold     Javad participated in dynamic functional therapeutic activities to improve functional performance for  12 minutes, including:  Sidelying hip abduction 2 x 10  Cybex hip abduction 1 plate x 30 each leg  Cybex hip flexion 1 plate x 30 each leg  Cybex hip extension 1 plate x 30 each leg    Javad participated in gait training to improve functional mobility and safety for 0 minutes, including:  Ambulating a lap around gym with focus on keeping toes pointing forward and heel/toe pattern -   Increased step length on LLE    Javad received the following direct contact modalities after being cleared for contraindications:     Javad received the following supervised modalities after being cleared for contradictions:       Home Exercises Provided and Patient Education Provided     Education provided: reviewed total hip precautions    Written Home Exercises Provided: Patient instructed to cont prior HEP.  Exercises were reviewed and Javad was able to demonstrate them prior to the end of the session.  Javad demonstrated good  understanding of the education provided.     See EMR under Patient Instructions for exercises provided prior visit.    Assessment   Evaluation assessment:  Javad is a 53 y.o. male referred to outpatient Physical Therapy with a medical diagnosis of s/p right total hip replacement. Pt presents with typical postop symptoms of decreased hip range of motion, gait disturbance, right lower extremity weakness, pain, and overall decrease in functional mobility. Patient was re-educated on total hip precautions. Patient is requiring assistance with all activiites of daily living except getting up and down from a chair and on and off the toilet.     Current assessment:  Javad was able to increase repetitions on multihip exercises although he complained of fatigue quickly. His gait continues to improve gradually. He was tight in hip flexors and adductors. Will continue to progress as tolerated staying within total hip precautions.    Javad Is progressing well towards his goals.   Pt prognosis is Good.     Pt will continue to  benefit from skilled outpatient physical therapy to address the deficits listed in the problem list box on initial evaluation, provide pt/family education and to maximize pt's level of independence in the home and community environment.     Pt's spiritual, cultural and educational needs considered and pt agreeable to plan of care and goals.     Anticipated barriers to physical therapy: none    Goals:  Patient will be independent with home exercise program to facilitate carryover between visits.  Patient will be able to do a straight leg raise to allow him to get in and out of the bed independently.  Patient will be able to ambulate independently without assistive device for functional independence.   Patient will be able to go up and down 6 steps reciprocally without handrails to be able to get in and out of the front of his house independently.  Patient will report decrease in right hip pain to </= 1/10 for improved quality of life.  Patient will have >/= to 4+/5 strength in right hip/lower extremity for improved stability with gait and activiites of daily living.  Patient will be able to step over edge of tub to allow him to shower independently.  Patient will be able to dress and drive independently.    Plan     Plan of care Certification: 4/4/2023 to 6/2/2023.     Outpatient Physical Therapy 2 times weekly for 8 weeks to include the following interventions: Electrical Stimulation IFC/premod as needed, Gait Training, Manual Therapy, Moist Heat/ Ice, Neuromuscular Re-ed, Patient Education, Therapeutic Activities, Therapeutic Exercise, and Ultrasound.        Kaylee Albert, PTA  5/15/2023

## 2023-05-17 ENCOUNTER — CLINICAL SUPPORT (OUTPATIENT)
Dept: REHABILITATION | Facility: HOSPITAL | Age: 53
End: 2023-05-17
Payer: MEDICAID

## 2023-05-17 DIAGNOSIS — M25.551 ACUTE POSTOPERATIVE PAIN OF RIGHT HIP: ICD-10-CM

## 2023-05-17 DIAGNOSIS — M25.661 DECREASED RANGE OF MOTION OF RIGHT LOWER EXTREMITY: ICD-10-CM

## 2023-05-17 DIAGNOSIS — G89.18 ACUTE POSTOPERATIVE PAIN OF RIGHT HIP: ICD-10-CM

## 2023-05-17 DIAGNOSIS — R26.9 GAIT DISTURBANCE: ICD-10-CM

## 2023-05-17 DIAGNOSIS — Z96.641 S/P TOTAL RIGHT HIP ARTHROPLASTY: Primary | ICD-10-CM

## 2023-05-17 PROCEDURE — 97110 THERAPEUTIC EXERCISES: CPT

## 2023-05-17 PROCEDURE — 97112 NEUROMUSCULAR REEDUCATION: CPT

## 2023-05-17 PROCEDURE — 97530 THERAPEUTIC ACTIVITIES: CPT

## 2023-05-17 PROCEDURE — 97140 MANUAL THERAPY 1/> REGIONS: CPT

## 2023-05-17 NOTE — PROGRESS NOTES
Physical Therapy Treatment Note     Name: Javad Garcia  Clinic Number: [id number]    Therapy Diagnosis:        Encounter Diagnosis   Name Primary?    S/P total right hip arthroplasty      Physician: Earl Baires MD    Visit Date: 5/17/2023    Physician Orders: PT Eval and Treat   Medical Diagnosis from Referral: see above  Evaluation Date: 4/4/2023  Authorization Period Expiration: 6/2/2023   Plan of Care Expiration: 6/2/2023     Visit # / Visits authorized: 9/17     PTA Visit #:     Time In: 10:43 am  Time Out: 11:36 am  Total Billable Time: 53 minutes    Precautions: Standard and total hip  Functional Level Prior to Evaluation: independent     Subjective     Pt reports: he drove here today and that makes him a little stiff.  He was compliant with home exercise program.  Response to previous treatment: he was pretty sore   Functional change: no assistive device     Pain: 6/10  Location: right hip      Objective     Javad received therapeutic exercises to develop strength, endurance, ROM, and flexibility for 14 minutes including:  Bike x 5 minutes  Incline board stretch x 2 minutes  Clams (right) 3 x 5  LAQ 5#   Rail squats 3 x 10 - cues to not shift weight to left    Javad received the following manual therapy techniques: passive range of motion was applied to the: right hip for 12 minutes, including:  Manual hip flexion to 90 (w/ knee bent); hip adductor stretch; sidelying hip flexor stretch; prone quad stretch; supine hamstring stretch; hip external rotation stretch; MFR/STM to glute med and ITB    Javad participated in neuromuscular re-education activities to improve: Balance, Coordination, Proprioception, quad control and hip stability for 15 minutes. The following activities were included:  Hooklying hip adduction squeezes with bridge - 3 second hold x 30  Straight leg raise 3 x 10  Single leg stance (right) - floor 3 x 20 seconds, foam pad 3 x 20 seconds  Hooklying hip abduction (blue) with bridge 3 x  10  Prone hip extension w/ stabilization 2 x 10    Javad participated in dynamic functional therapeutic activities to improve functional performance for 12 minutes, including:  Sidelying hip abduction 2 x 10  Cybex hip abduction 1 plate x 30 each leg  Cybex hip flexion 1 plate x 30 each leg  Cybex hip extension 1 plate x 30 each leg    Javad participated in gait training to improve functional mobility and safety for 0 minutes, including:  Ambulating a lap around gym with focus on keeping toes pointing forward and heel/toe pattern -   Increased step length on LLE    Javad received the following direct contact modalities after being cleared for contraindications:     Javad received the following supervised modalities after being cleared for contradictions:       Home Exercises Provided and Patient Education Provided     Education provided: reviewed total hip precautions    Written Home Exercises Provided: Patient instructed to cont prior HEP.  Exercises were reviewed and Javad was able to demonstrate them prior to the end of the session.  Javad demonstrated good  understanding of the education provided.     See EMR under Patient Instructions for exercises provided prior visit.    Assessment   Evaluation assessment:  Javad is a 53 y.o. male referred to outpatient Physical Therapy with a medical diagnosis of s/p right total hip replacement. Pt presents with typical postop symptoms of decreased hip range of motion, gait disturbance, right lower extremity weakness, pain, and overall decrease in functional mobility. Patient was re-educated on total hip precautions. Patient is requiring assistance with all activiites of daily living except getting up and down from a chair and on and off the toilet.     Current assessment:  Javad is 7 weeks postop right total hip replacement. He is no longer using an assistive device for ambulation. He is also back to driving. He still complains of pain and stiffness in the right hip. He was tight  in hip flexors and adductors and is tender to palpation in right hip musculature and along ITB. He does not tolerate MFR/STM well and cannot tolerate the percussion gun. Javad required cueing with rail squats to not shift weight to the left which he was able to correct with cueing. His gait continues to improve gradually.  Will continue to progress as tolerated staying within total hip precautions.    Javad Is progressing well towards his goals.   Pt prognosis is Good.     Pt will continue to benefit from skilled outpatient physical therapy to address the deficits listed in the problem list box on initial evaluation, provide pt/family education and to maximize pt's level of independence in the home and community environment.     Pt's spiritual, cultural and educational needs considered and pt agreeable to plan of care and goals.     Anticipated barriers to physical therapy: none    Goals:  Patient will be independent with home exercise program to facilitate carryover between visits. MET  Patient will be able to do a straight leg raise to allow him to get in and out of the bed independently. MET  Patient will be able to ambulate independently without assistive device for functional independence. MET - but still has antalgic gait pattern  Patient will be able to go up and down 6 steps reciprocally without handrails to be able to get in and out of the front of his house independently. NOT MET  Patient will report decrease in right hip pain to </= 1/10 for improved quality of life. NOT MET  Patient will have >/= to 4+/5 strength in right hip/lower extremity for improved stability with gait and activiites of daily living. NOT MET/ONGOING  Patient will be able to step over edge of tub to allow him to shower independently. NOT MET  Patient will be able to dress and drive independently. MET    Plan     Plan of care Certification: 4/4/2023 to 6/2/2023.     Outpatient Physical Therapy 2 times weekly for 8 weeks to include the  following interventions: Electrical Stimulation IFC/premod as needed, Gait Training, Manual Therapy, Moist Heat/ Ice, Neuromuscular Re-ed, Patient Education, Therapeutic Activities, Therapeutic Exercise, and Ultrasound.        MURIEL SHAH, PT  5/17/2023

## 2023-05-22 ENCOUNTER — CLINICAL SUPPORT (OUTPATIENT)
Dept: REHABILITATION | Facility: HOSPITAL | Age: 53
End: 2023-05-22
Payer: MEDICAID

## 2023-05-22 DIAGNOSIS — Z96.641 S/P TOTAL RIGHT HIP ARTHROPLASTY: Primary | ICD-10-CM

## 2023-05-22 DIAGNOSIS — M25.661 DECREASED RANGE OF MOTION OF RIGHT LOWER EXTREMITY: ICD-10-CM

## 2023-05-22 PROCEDURE — 97530 THERAPEUTIC ACTIVITIES: CPT | Mod: CQ

## 2023-05-22 PROCEDURE — 97112 NEUROMUSCULAR REEDUCATION: CPT | Mod: CQ

## 2023-05-22 PROCEDURE — 97110 THERAPEUTIC EXERCISES: CPT | Mod: CQ

## 2023-05-22 NOTE — PROGRESS NOTES
Physical Therapy Treatment Note     Name: Javad Garcia  Clinic Number: [id number]    Therapy Diagnosis:        Encounter Diagnosis   Name Primary?    S/P total right hip arthroplasty      Physician: Earl Baires MD    Visit Date: 5/22/2023    Physician Orders: PT Eval and Treat   Medical Diagnosis from Referral: see above  Evaluation Date: 4/4/2023  Authorization Period Expiration: 6/2/2023   Plan of Care Expiration: 6/2/2023     Visit # / Visits authorized: 10/17     PTA Visit #: 1    Time In: 10:45 am  Time Out: 11:25 am  Total Billable Time: 40 minutes    Precautions: Standard and total hip  Functional Level Prior to Evaluation: independent     Subjective     Pt reports: he hurt a good bit from the myofascial release last visit - Was still sore on Saturday.  He was compliant with home exercise program.  Response to previous treatment: he was pretty sore   Functional change: no assistive device     Pain: 5/10  Location: right hip      Objective     Javad received therapeutic exercises to develop strength, endurance, ROM, and flexibility for 16 minutes including:  Bike x 5 minutes  Incline board stretch x 2 minutes  Clams (right) 2 x 10  LAQ 5#   Rail squats 3 x 10 - cues to not shift weight to left  Butterfly external rotation stretch 3 x 15 second hold   Supine hip flexor stretch 3 x 30 second hold     Javad received the following manual therapy techniques: passive range of motion was applied to the: right hip for 0 minutes, including:  Manual hip flexion to 90 (w/ knee bent); hip adductor stretch; sidelying hip flexor stretch; prone quad stretch; supine hamstring stretch; hip external rotation stretch; MFR/STM to glute med and iliotibial band (not performed today)     Javad participated in neuromuscular re-education activities to improve: Balance, Coordination, Proprioception, quad control and hip stability for 14 minutes. The following activities were included:  Hooklying hip adduction squeezes with bridge - 3  second hold x 30  Straight leg raise 3 x 10  Single leg stance (right) - foam pad 3 x 20 seconds  Hooklying hip abduction (blue) with bridge 3 x 10  Prone hip extension w/ stabilization 3 x 10    Javad participated in dynamic functional therapeutic activities to improve functional performance for 10 minutes, including:  Sidelying hip abduction 2 x 10  Cybex hip abduction 2 plates x 30 each leg  Cybex hip flexion 2 plates x 30 each leg  Cybex hip extension 2 plates x 30 each leg    Javad participated in gait training to improve functional mobility and safety for 0 minutes, including:  Ambulating a lap around gym with focus on keeping toes pointing forward and heel/toe pattern -   Increased step length on LLE    Javad received the following direct contact modalities after being cleared for contraindications:     Javad received the following supervised modalities after being cleared for contradictions:       Home Exercises Provided and Patient Education Provided     Education provided: reviewed total hip precautions    Written Home Exercises Provided: Patient instructed to cont prior HEP.  Exercises were reviewed and aJvad was able to demonstrate them prior to the end of the session.  Javad demonstrated good  understanding of the education provided.     See EMR under Patient Instructions for exercises provided prior visit.    Assessment   Evaluation assessment:  Javad is a 53 y.o. male referred to outpatient Physical Therapy with a medical diagnosis of s/p right total hip replacement. Pt presents with typical postop symptoms of decreased hip range of motion, gait disturbance, right lower extremity weakness, pain, and overall decrease in functional mobility. Patient was re-educated on total hip precautions. Patient is requiring assistance with all activiites of daily living except getting up and down from a chair and on and off the toilet.     Current assessment:  Javad complained of a lot of soreness after myofascial release  last visit. He was able to increase weight on multihip to 2 plates without complaint. He still requires stabilization with prone hip extension and sidelying hip abduction exercises. He was shown how to do supine hip flexor and butterfly stretches at home. Will continue to progress as tolerated staying within total hip precautions.    Javad Is progressing well towards his goals.   Pt prognosis is Good.     Pt will continue to benefit from skilled outpatient physical therapy to address the deficits listed in the problem list box on initial evaluation, provide pt/family education and to maximize pt's level of independence in the home and community environment.     Pt's spiritual, cultural and educational needs considered and pt agreeable to plan of care and goals.     Anticipated barriers to physical therapy: none    Goals:  Patient will be independent with home exercise program to facilitate carryover between visits. MET  Patient will be able to do a straight leg raise to allow him to get in and out of the bed independently. MET  Patient will be able to ambulate independently without assistive device for functional independence. MET - but still has antalgic gait pattern  Patient will be able to go up and down 6 steps reciprocally without handrails to be able to get in and out of the front of his house independently. NOT MET  Patient will report decrease in right hip pain to </= 1/10 for improved quality of life. NOT MET  Patient will have >/= to 4+/5 strength in right hip/lower extremity for improved stability with gait and activiites of daily living. NOT MET/ONGOING  Patient will be able to step over edge of tub to allow him to shower independently. NOT MET  Patient will be able to dress and drive independently. MET    Plan     Plan of care Certification: 4/4/2023 to 6/2/2023.     Outpatient Physical Therapy 2 times weekly for 8 weeks to include the following interventions: Electrical Stimulation IFC/premod as needed,  Gait Training, Manual Therapy, Moist Heat/ Ice, Neuromuscular Re-ed, Patient Education, Therapeutic Activities, Therapeutic Exercise, and Ultrasound.        Kaylee Albert, PTA  5/22/2023

## 2023-05-24 ENCOUNTER — CLINICAL SUPPORT (OUTPATIENT)
Dept: REHABILITATION | Facility: HOSPITAL | Age: 53
End: 2023-05-24
Payer: MEDICAID

## 2023-05-24 DIAGNOSIS — Z96.641 S/P TOTAL RIGHT HIP ARTHROPLASTY: Primary | ICD-10-CM

## 2023-05-24 PROCEDURE — 97112 NEUROMUSCULAR REEDUCATION: CPT | Mod: CQ

## 2023-05-24 PROCEDURE — 97530 THERAPEUTIC ACTIVITIES: CPT | Mod: CQ

## 2023-05-24 PROCEDURE — 97110 THERAPEUTIC EXERCISES: CPT | Mod: CQ

## 2023-05-24 NOTE — PROGRESS NOTES
Physical Therapy Treatment Note     Name: Javad Garcia  Clinic Number: [id number]    Therapy Diagnosis:        Encounter Diagnosis   Name Primary?    S/P total right hip arthroplasty      Physician: Earl Baires MD    Visit Date: 5/24/2023    Physician Orders: PT Eval and Treat   Medical Diagnosis from Referral: see above  Evaluation Date: 4/4/2023  Authorization Period Expiration: 6/2/2023   Plan of Care Expiration: 6/2/2023     Visit # / Visits authorized: 11/17     PTA Visit #: 2    Time In: 10:45 am  Time Out: 11:36 am  Total Billable Time: 47 minutes    Precautions: Standard and total hip  Functional Level Prior to Evaluation: independent     Subjective     Pt reports: he has been aching a good bit recently - thinks because of the rain.   He was compliant with home exercise program.  Response to previous treatment: he was pretty sore   Functional change: no assistive device     Pain: 5-6/10  Location: right hip      Objective     Javad received therapeutic exercises to develop strength, endurance, ROM, and flexibility for 25 minutes including:  Bike x 5 minutes  Incline board stretch x 2 minutes  Clams (right)   Bilateral leg press 8 plates with hip abduction with blue 3 x 10  Right leg press 4 plates 3 x 10  Butterfly external rotation stretch 5 x 15 second hold   Supine hip flexor stretch 5 x 20 second hold     Javad received the following manual therapy techniques: passive range of motion was applied to the: right hip for 0 minutes, including:  Manual hip flexion to 90 (w/ knee bent); hip adductor stretch; sidelying hip flexor stretch; prone quad stretch; supine hamstring stretch; hip external rotation stretch; MFR/STM to glute med and iliotibial band (not performed today)     Jaavd participated in neuromuscular re-education activities to improve: Balance, Coordination, Proprioception, quad control and hip stability for 14 minutes. The following activities were included:  Hooklying hip adduction squeezes  with bridge - 3 second hold x 30  Straight leg raise 3 x 10  Single leg stance (right) - foam pad 3 x 30 seconds  Hooklying hip abduction (blue) with bridge 3 x 10  Prone hip extension w/ stabilization 3 x 10    Javad participated in dynamic functional therapeutic activities to improve functional performance for 10 minutes, including:  Squats in mirror 2 x 10  Cybex hip abduction 2 plates x 30 each leg  Cybex hip flexion 2 plates x 30 each leg  Cybex hip extension 2 plates x 30 each leg    Javad participated in gait training to improve functional mobility and safety for 0 minutes, including:  Ambulating a lap around gym with focus on keeping toes pointing forward and heel/toe pattern -   Increased step length on LLE    Javad received the following direct contact modalities after being cleared for contraindications:     Javad received the following supervised modalities after being cleared for contradictions:       Home Exercises Provided and Patient Education Provided     Education provided: reviewed total hip precautions    Written Home Exercises Provided: Patient instructed to cont prior HEP.  Exercises were reviewed and Javad was able to demonstrate them prior to the end of the session.  Javad demonstrated good  understanding of the education provided.     See EMR under Patient Instructions for exercises provided prior visit.    Assessment   Evaluation assessment:  Javad is a 53 y.o. male referred to outpatient Physical Therapy with a medical diagnosis of s/p right total hip replacement. Pt presents with typical postop symptoms of decreased hip range of motion, gait disturbance, right lower extremity weakness, pain, and overall decrease in functional mobility. Patient was re-educated on total hip precautions. Patient is requiring assistance with all activiites of daily living except getting up and down from a chair and on and off the toilet.     Current assessment:  Javad complained of aching a lot today. He was able to  add leg press without complaint of difficulty, bilateral and unilateral. He continues to have trouble with prone hip extension. Will continue to progress as tolerated staying within total hip precautions.    Javad Is progressing well towards his goals.   Pt prognosis is Good.     Pt will continue to benefit from skilled outpatient physical therapy to address the deficits listed in the problem list box on initial evaluation, provide pt/family education and to maximize pt's level of independence in the home and community environment.     Pt's spiritual, cultural and educational needs considered and pt agreeable to plan of care and goals.     Anticipated barriers to physical therapy: none    Goals:  Patient will be independent with home exercise program to facilitate carryover between visits. MET  Patient will be able to do a straight leg raise to allow him to get in and out of the bed independently. MET  Patient will be able to ambulate independently without assistive device for functional independence. MET   Patient will be able to go up and down 6 steps reciprocally without handrails to be able to get in and out of the front of his house independently. NOT MET  Patient will report decrease in right hip pain to </= 1/10 for improved quality of life. NOT MET  Patient will have >/= to 4+/5 strength in right hip/lower extremity for improved stability with gait and activiites of daily living. NOT MET/ONGOING  Patient will be able to step over edge of tub to allow him to shower independently.  MET  Patient will be able to dress and drive independently. MET    Plan     Plan of care Certification: 4/4/2023 to 6/2/2023.     Outpatient Physical Therapy 2 times weekly for 8 weeks to include the following interventions: Electrical Stimulation IFC/premod as needed, Gait Training, Manual Therapy, Moist Heat/ Ice, Neuromuscular Re-ed, Patient Education, Therapeutic Activities, Therapeutic Exercise, and Ultrasound.        Kaylee  Roosevelt, PTA  5/24/2023

## 2023-05-30 ENCOUNTER — CLINICAL SUPPORT (OUTPATIENT)
Dept: REHABILITATION | Facility: HOSPITAL | Age: 53
End: 2023-05-30
Payer: MEDICAID

## 2023-05-30 DIAGNOSIS — Z96.641 S/P TOTAL RIGHT HIP ARTHROPLASTY: Primary | ICD-10-CM

## 2023-05-30 PROCEDURE — 97530 THERAPEUTIC ACTIVITIES: CPT | Mod: CQ

## 2023-05-30 PROCEDURE — 97110 THERAPEUTIC EXERCISES: CPT | Mod: CQ

## 2023-05-30 PROCEDURE — 97112 NEUROMUSCULAR REEDUCATION: CPT | Mod: CQ

## 2023-05-30 NOTE — PROGRESS NOTES
Physical Therapy Treatment Note     Name: Javad Garcia  Clinic Number: [id number]    Therapy Diagnosis:        Encounter Diagnosis   Name Primary?    S/P total right hip arthroplasty      Physician: Earl Baires MD    Visit Date: 5/30/2023    Physician Orders: PT Eval and Treat   Medical Diagnosis from Referral: see above  Evaluation Date: 4/4/2023  Authorization Period Expiration: 6/2/2023   Plan of Care Expiration: 6/2/2023     Visit # / Visits authorized: 12/17     PTA Visit #: 3    Time In: 10:45 am  Time Out: 11:28 am  Total Billable Time: 43 minutes    Precautions: Standard and total hip  Functional Level Prior to Evaluation: independent     Subjective     Pt reports: he is still feeling tight and sore.   He was compliant with home exercise program.  Response to previous treatment: he was pretty sore   Functional change: no assistive device     Pain: 5-6/10  Location: right hip      Objective     Javad received therapeutic exercises to develop strength, endurance, ROM, and flexibility for 17 minutes including:  Bike x 5 minutes  Incline board stretch x 2 minutes  Clams (right)   Bilateral leg press 8 plates with hip abduction with blue 3 x 10  Right leg press 5 plates 3 x 10  Butterfly external rotation stretch 5 x 15 second hold   Supine hip flexor stretch 5 x 20 second hold     Javad received the following manual therapy techniques: passive range of motion was applied to the: right hip for 5 minutes, including:  Manual hip flexion to 90 (w/ knee bent); hip adductor stretch; sidelying hip flexor stretch; prone quad stretch; supine hamstring stretch; hip external rotation stretch; MFR/STM to glute med and iliotibial band (not performed today)     Javad participated in neuromuscular re-education activities to improve: Balance, Coordination, Proprioception, quad control and hip stability for 11 minutes. The following activities were included:  Hooklying hip adduction squeezes with bridge - 3 second hold x  30  Straight leg raise   Single leg stance (right) - foam pad 3 x 30 seconds  Hooklying hip abduction (blue) with bridge 3 x 10  Prone hip extension w/ stabilization 3 x 10    Javad participated in dynamic functional therapeutic activities to improve functional performance for 10 minutes, including:  Squats in mirror   Cybex hip abduction 2 plates x 30 each leg  Cybex hip flexion 2 plates x 30 each leg  Cybex hip extension 2 plates x 30 each leg    Javad participated in gait training to improve functional mobility and safety for 0 minutes, including:  Ambulating a lap around gym with focus on keeping toes pointing forward and heel/toe pattern -   Increased step length on LLE      Home Exercises Provided and Patient Education Provided     Education provided: reviewed total hip precautions    Written Home Exercises Provided: Patient instructed to cont prior HEP.  Exercises were reviewed and Javad was able to demonstrate them prior to the end of the session.  Javad demonstrated good  understanding of the education provided.     See EMR under Patient Instructions for exercises provided prior visit.    Assessment   Evaluation assessment:  Javad is a 53 y.o. male referred to outpatient Physical Therapy with a medical diagnosis of s/p right total hip replacement. Pt presents with typical postop symptoms of decreased hip range of motion, gait disturbance, right lower extremity weakness, pain, and overall decrease in functional mobility. Patient was re-educated on total hip precautions. Patient is requiring assistance with all activiites of daily living except getting up and down from a chair and on and off the toilet.     Current assessment:  Javad remains tight throughout hip. He received myofascial release at the end of treatment to help combat tightness and soreness.  His gait is improving with better unilateral stance time.  Will continue to progress as tolerated staying within total hip precautions.    Javad Is progressing  well towards his goals.   Pt prognosis is Good.     Pt will continue to benefit from skilled outpatient physical therapy to address the deficits listed in the problem list box on initial evaluation, provide pt/family education and to maximize pt's level of independence in the home and community environment.     Pt's spiritual, cultural and educational needs considered and pt agreeable to plan of care and goals.     Anticipated barriers to physical therapy: none    Goals:  Patient will be independent with home exercise program to facilitate carryover between visits. MET  Patient will be able to do a straight leg raise to allow him to get in and out of the bed independently. MET  Patient will be able to ambulate independently without assistive device for functional independence. MET   Patient will be able to go up and down 6 steps reciprocally without handrails to be able to get in and out of the front of his house independently. NOT MET  Patient will report decrease in right hip pain to </= 1/10 for improved quality of life. NOT MET  Patient will have >/= to 4+/5 strength in right hip/lower extremity for improved stability with gait and activiites of daily living. NOT MET/ONGOING  Patient will be able to step over edge of tub to allow him to shower independently.  MET  Patient will be able to dress and drive independently. MET    Plan     Plan of care Certification: 4/4/2023 to 6/2/2023.     Outpatient Physical Therapy 2 times weekly for 8 weeks to include the following interventions: Electrical Stimulation IFC/premod as needed, Gait Training, Manual Therapy, Moist Heat/ Ice, Neuromuscular Re-ed, Patient Education, Therapeutic Activities, Therapeutic Exercise, and Ultrasound.        Kaylee Albert, PTA  5/30/2023

## 2023-06-01 ENCOUNTER — CLINICAL SUPPORT (OUTPATIENT)
Dept: REHABILITATION | Facility: HOSPITAL | Age: 53
End: 2023-06-01
Payer: MEDICAID

## 2023-06-01 DIAGNOSIS — M25.661 DECREASED RANGE OF MOTION OF RIGHT LOWER EXTREMITY: ICD-10-CM

## 2023-06-01 DIAGNOSIS — Z96.641 S/P TOTAL RIGHT HIP ARTHROPLASTY: Primary | ICD-10-CM

## 2023-06-01 DIAGNOSIS — R26.9 GAIT DISTURBANCE: ICD-10-CM

## 2023-06-01 PROCEDURE — 97110 THERAPEUTIC EXERCISES: CPT

## 2023-06-01 PROCEDURE — 97112 NEUROMUSCULAR REEDUCATION: CPT

## 2023-06-01 PROCEDURE — 97530 THERAPEUTIC ACTIVITIES: CPT

## 2023-06-01 NOTE — PROGRESS NOTES
Physical Therapy Treatment Note     Name: Javad Garcia  Clinic Number: [id number]    Therapy Diagnosis:        Encounter Diagnosis   Name Primary?    S/P total right hip arthroplasty      Physician: Earl Baries MD    Visit Date: 6/1/2023    Physician Orders: PT Eval and Treat   Medical Diagnosis from Referral: see above  Evaluation Date: 4/4/2023  Authorization Period Expiration: 6/2/2023   Plan of Care Expiration: 6/30/2023     Visit # / Visits authorized: 13/17     PTA Visit #:     Time In: 8:29 am  Time Out: 9:21 am  Total Billable Time: 49 minutes    Precautions: Standard and total hip  Functional Level Prior to Evaluation: independent     Subjective     Pt reports: says his hip isn't hurting much this morning but still rates pain at a 5/10.   He was compliant with home exercise program.  Response to previous treatment: he was pretty sore   Functional change: no assistive device     Pain: 5/10  Location: right hip      Objective     Javad received therapeutic exercises to develop strength, endurance, ROM, and flexibility for 17 minutes including:  Bike x 5 minutes  Incline board stretch x 2 minutes  Clams (right)   Bilateral leg press 8 plates with hip abduction with blue 3 x 10  Right leg press 5 plates 3 x 10 - blue  Butterfly external rotation stretch 5 x 15 second hold   Supine hip flexor stretch 3 x 30 second hold     Javad received the following manual therapy techniques: passive range of motion was applied to the: right hip for 5 minutes, including:  Manual hip flexion to 90 (w/ knee bent); hip adductor stretch; sidelying hip flexor stretch; prone quad stretch; supine hamstring stretch; hip external rotation stretch;   MFR/STM to glute med and iliotibial band (not performed today)     Javad participated in neuromuscular re-education activities to improve: Balance, Coordination, Proprioception, quad control and hip stability for 15 minutes. The following activities were included:  Hooklying hip adduction  squeezes with bridge - 3 second hold x 30  Straight leg raise 3 x 10  Single leg stance (right) - foam pad 3 x 30 seconds  Hooklying hip abduction (blue) with bridge 3 x 10  Prone hip extension w/ stabilization 3 x 10    Javad participated in dynamic functional therapeutic activities to improve functional performance for 12 minutes, including:  Squats in mirror   Cybex hip abduction 3 plates x 20 each leg  Cybex hip flexion 3 plates x 20 each leg  Cybex hip extension 3 plates x 20 each leg    Javad participated in gait training to improve functional mobility and safety for 0 minutes, including:  Ambulating a lap around gym with focus on keeping toes pointing forward and heel/toe pattern -   Increased step length on LLE      Home Exercises Provided and Patient Education Provided     Education provided: reviewed total hip precautions    Written Home Exercises Provided: Patient instructed to cont prior HEP.  Exercises were reviewed and Javad was able to demonstrate them prior to the end of the session.  Javad demonstrated good  understanding of the education provided.     See EMR under Patient Instructions for exercises provided prior visit.    Assessment   Evaluation assessment:  Javad is a 53 y.o. male referred to outpatient Physical Therapy with a medical diagnosis of s/p right total hip replacement. Pt presents with typical postop symptoms of decreased hip range of motion, gait disturbance, right lower extremity weakness, pain, and overall decrease in functional mobility. Patient was re-educated on total hip precautions. Patient is requiring assistance with all activiites of daily living except getting up and down from a chair and on and off the toilet.     Current assessment:  Javad remains tight throughout hip. He received stretching to help address tightness and soreness.  He was able to increase weight on Cybex multi-hip today. Added prone quad/hip flexor stretch to home exercise program. His gait is improving with  better unilateral stance time. He has not met all of his goals to date. Will continue to progress as tolerated staying within total hip precautions to work toward meeting all therapy goals to improve functional independence.    Javad Is progressing well towards his goals.   Pt prognosis is Good.     Pt will continue to benefit from skilled outpatient physical therapy to address the deficits listed in the problem list box on initial evaluation, provide pt/family education and to maximize pt's level of independence in the home and community environment.     Pt's spiritual, cultural and educational needs considered and pt agreeable to plan of care and goals.     Anticipated barriers to physical therapy: none    Goals:  Patient will be independent with home exercise program to facilitate carryover between visits. MET  Patient will be able to do a straight leg raise to allow him to get in and out of the bed independently. MET  Patient will be able to ambulate independently without assistive device for functional independence. MET   Patient will be able to go up and down 6 steps reciprocally without handrails to be able to get in and out of the front of his house independently. NOT MET - still uses rails and still lacks some eccentric control descending with right lower extremity  Patient will report decrease in right hip pain to </= 1/10 for improved quality of life. NOT MET - he still rates his pain 5/10 even though he says it isn't hurting much anymore  Patient will have >/= to 4+/5 strength in right hip/lower extremity for improved stability with gait and activiites of daily living. NOT MET/ONGOING - he has been able to increase weight and/or reps consistently  Patient will be able to step over edge of tub to allow him to shower independently.  MET  Patient will be able to dress and drive independently. MET    Plan     Updated Certification Period:  6/5/2023 to 6/30/2023  Recommended Treatment Plan: 2 times per week  for 4 weeks: Gait Training, Manual Therapy, Moist Heat/ Ice, Neuromuscular Re-ed, Patient Education, Therapeutic Activities, and Therapeutic Exercise  Other Recommendations: continue to focus on hip strength and range of motion       MURIEL SHAH, PT  6/1/2023

## 2023-06-03 NOTE — PLAN OF CARE
Physical Therapy Updated Plan of Care     Name: Javad Garcia  Clinic Number: [id number]    Therapy Diagnosis:        Encounter Diagnosis   Name Primary?    S/P total right hip arthroplasty      Physician: Earl Baires MD    Visit Date: 6/1/2023    Physician Orders: PT Eval and Treat   Medical Diagnosis from Referral: see above  Evaluation Date: 4/4/2023  Authorization Period Expiration: 6/2/2023   Plan of Care Expiration: 6/2/2023     Visit # / Visits authorized: 13/17     See daily note for today's physical therapy treatment.    Reasons for Recertification of Therapy: Javad remains tight throughout hip. He received stretching to help address tightness and soreness.  He was able to increase weight on Cybex multi-hip today. Added prone quad/hip flexor stretch to home exercise program. His gait is improving with better unilateral stance time. He has not met all of his goals to date. Will continue to progress as tolerated staying within total hip precautions to work toward meeting all therapy goals to improve functional independence.    Goals:  Patient will be independent with home exercise program to facilitate carryover between visits. MET  Patient will be able to do a straight leg raise to allow him to get in and out of the bed independently. MET  Patient will be able to ambulate independently without assistive device for functional independence. MET   Patient will be able to go up and down 6 steps reciprocally without handrails to be able to get in and out of the front of his house independently. NOT MET - still uses rails and still lacks some eccentric control descending with right lower extremity  Patient will report decrease in right hip pain to </= 1/10 for improved quality of life. NOT MET - he still rates his pain 5/10 even though he says it isn't hurting much anymore  Patient will have >/= to 4+/5 strength in right hip/lower extremity for improved stability with gait and activiites of daily living. NOT  MET/ONGOING - he has been able to increase weight and/or reps consistently  Patient will be able to step over edge of tub to allow him to shower independently.  MET  Patient will be able to dress and drive independently. MET    Plan     Updated Certification Period:  6/5/2023 to 6/30/2023  Recommended Treatment Plan: 2 times per week for 4 weeks: Gait Training, Manual Therapy, Moist Heat/ Ice, Neuromuscular Re-ed, Patient Education, Therapeutic Activities, and Therapeutic Exercise  Other Recommendations: continue to focus on hip strength and range of motion     MURIEL SHAH, PT  6/3/2023      I CERTIFY THE NEED FOR THESE SERVICES FURNISHED UNDER THIS PLAN OF TREATMENT AND WHILE UNDER MY CARE.    Physician's comments:      Physician's Signature: ___________________________________________________

## 2023-06-09 ENCOUNTER — CLINICAL SUPPORT (OUTPATIENT)
Dept: REHABILITATION | Facility: HOSPITAL | Age: 53
End: 2023-06-09
Payer: MEDICAID

## 2023-06-09 DIAGNOSIS — Z96.641 S/P TOTAL RIGHT HIP ARTHROPLASTY: Primary | ICD-10-CM

## 2023-06-09 PROCEDURE — 97530 THERAPEUTIC ACTIVITIES: CPT | Mod: CQ

## 2023-06-09 PROCEDURE — 97110 THERAPEUTIC EXERCISES: CPT | Mod: CQ

## 2023-06-09 PROCEDURE — 97112 NEUROMUSCULAR REEDUCATION: CPT | Mod: CQ

## 2023-06-09 NOTE — PROGRESS NOTES
Physical Therapy Treatment Note     Name: Javad Garcia  Clinic Number: [id number]    Therapy Diagnosis:        Encounter Diagnosis   Name Primary?    S/P total right hip arthroplasty      Physician: Earl Baires MD    Visit Date: 6/9/2023    Physician Orders: PT Eval and Treat   Medical Diagnosis from Referral: see above  Evaluation Date: 4/4/2023  Authorization Period Expiration: 6/2/2023   Plan of Care Expiration: 6/30/2023     Visit # / Visits authorized: 14/17     PTA Visit #: 1/5    Time In: 08:58 am  Time Out: 09:42 am  Total Billable Time: 44 minutes    Precautions: Standard and total hip  Functional Level Prior to Evaluation: independent     Subjective     Pt reports: says his hip is a little sore today.  Reports his exercises at home are going well.   He was compliant with home exercise program.  Response to previous treatment: he was pretty sore   Functional change: no assistive device     Pain: 6/10  Location: right hip      Objective     Javad received therapeutic exercises to develop strength, endurance, ROM, and flexibility for 17 minutes including:    Bike x 5 minutes  Incline board stretch x 2 minutes  Clams (right)   Bilateral leg press 8 plates with hip abduction with blue 3 x 10  Right leg press 3 plates 3 x 10   Butterfly external rotation stretch 5 x 15 second hold   Supine hip flexor stretch 3 x 30 second hold (not performed today)     Jaavd received the following manual therapy techniques: passive range of motion was applied to the: right hip for 0 minutes, including:  Manual hip flexion to 90 (w/ knee bent); hip adductor stretch; sidelying hip flexor stretch; prone quad stretch; supine hamstring stretch; hip external rotation stretch;   MFR/STM to glute med and iliotibial band (not performed today)     Javad participated in neuromuscular re-education activities to improve: Balance, Coordination, Proprioception, quad control and hip stability for 15 minutes. The following activities were  included:    Hooklying hip adduction squeezes with bridge - 3 second hold x 30  Straight leg raise 3 x 10  Single leg stance (right) - foam pad 3 x 30 seconds  Hooklying hip abduction (blue) with bridge 3 x 10  Prone hip extension w/ stabilization 3 x 10 RLE    Javad participated in dynamic functional therapeutic activities to improve functional performance for 12 minutes, including:  Squats in mirror   Fwd step ups x 20 RLE 6inch step   TRX squats with cues to weight shift evenly to RLE x 15  Cybex hip abduction 3 plates x 20 each leg  Cybex hip flexion 3 plates x 20 each leg  Cybex hip extension 3 plates x 20 each leg    Javad participated in gait training to improve functional mobility and safety for 0 minutes, including:  Ambulating a lap around gym with focus on keeping toes pointing forward and heel/toe pattern -   Increased step length on LLE      Home Exercises Provided and Patient Education Provided     Education provided: reviewed total hip precautions    Written Home Exercises Provided: Patient instructed to cont prior HEP.  Exercises were reviewed and Javad was able to demonstrate them prior to the end of the session.  Javad demonstrated good  understanding of the education provided.     See EMR under Patient Instructions for exercises provided prior visit.    Assessment   Evaluation assessment:  Javad is a 53 y.o. male referred to outpatient Physical Therapy with a medical diagnosis of s/p right total hip replacement. Pt presents with typical postop symptoms of decreased hip range of motion, gait disturbance, right lower extremity weakness, pain, and overall decrease in functional mobility. Patient was re-educated on total hip precautions. Patient is requiring assistance with all activiites of daily living except getting up and down from a chair and on and off the toilet.     Current assessment: Javad arrived today with reports of mild soreness in the hip. He was unable to do 5 plates on SL press RLE today,  which he did complete at previous session. We had to drop the weight down to 3 plates since this was all he could tolerate. He did fatigue after isolated SL exercises on the RLE and SLS balancing on uneven foam surface.  His gait is improving with better unilateral stance time. We added TRX squats today and cued him to properly weight shift evenly to the RLE since he tends to shift majority of his weight to the LLE. Also added fwd step ups on 6inch step today. Will continue to progress as tolerated staying within total hip precautions to work toward meeting all therapy goals to improve functional independence.    Javad Is progressing well towards his goals.   Pt prognosis is Good.     Pt will continue to benefit from skilled outpatient physical therapy to address the deficits listed in the problem list box on initial evaluation, provide pt/family education and to maximize pt's level of independence in the home and community environment.     Pt's spiritual, cultural and educational needs considered and pt agreeable to plan of care and goals.     Anticipated barriers to physical therapy: none    Goals:  Patient will be independent with home exercise program to facilitate carryover between visits. MET  Patient will be able to do a straight leg raise to allow him to get in and out of the bed independently. MET  Patient will be able to ambulate independently without assistive device for functional independence. MET   Patient will be able to go up and down 6 steps reciprocally without handrails to be able to get in and out of the front of his house independently. NOT MET - still uses rails and still lacks some eccentric control descending with right lower extremity  Patient will report decrease in right hip pain to </= 1/10 for improved quality of life. NOT MET - he still rates his pain 5/10 even though he says it isn't hurting much anymore  Patient will have >/= to 4+/5 strength in right hip/lower extremity for improved  stability with gait and activiites of daily living. NOT MET/ONGOING - he has been able to increase weight and/or reps consistently  Patient will be able to step over edge of tub to allow him to shower independently.  MET  Patient will be able to dress and drive independently. MET    Plan     Updated Certification Period:  6/5/2023 to 6/30/2023  Recommended Treatment Plan: 2 times per week for 4 weeks: Gait Training, Manual Therapy, Moist Heat/ Ice, Neuromuscular Re-ed, Patient Education, Therapeutic Activities, and Therapeutic Exercise  Other Recommendations: continue to focus on hip strength and range of motion       Biju Langley, PTA  6/9/2023

## 2023-06-13 ENCOUNTER — CLINICAL SUPPORT (OUTPATIENT)
Dept: REHABILITATION | Facility: HOSPITAL | Age: 53
End: 2023-06-13
Payer: MEDICAID

## 2023-06-13 DIAGNOSIS — M25.661 DECREASED RANGE OF MOTION OF RIGHT LOWER EXTREMITY: ICD-10-CM

## 2023-06-13 DIAGNOSIS — Z96.641 S/P TOTAL RIGHT HIP ARTHROPLASTY: ICD-10-CM

## 2023-06-13 DIAGNOSIS — R26.9 GAIT DISTURBANCE: ICD-10-CM

## 2023-06-13 DIAGNOSIS — Z96.649 STATUS POST TOTAL REPLACEMENT OF HIP, UNSPECIFIED LATERALITY: Primary | ICD-10-CM

## 2023-06-13 PROCEDURE — 97112 NEUROMUSCULAR REEDUCATION: CPT | Mod: CQ

## 2023-06-13 PROCEDURE — 97530 THERAPEUTIC ACTIVITIES: CPT | Mod: CQ

## 2023-06-13 PROCEDURE — 97110 THERAPEUTIC EXERCISES: CPT | Mod: CQ

## 2023-06-13 NOTE — PROGRESS NOTES
Physical Therapy Treatment Note     Name: Javad Garcia  Clinic Number: [id number]    Therapy Diagnosis:        Encounter Diagnosis   Name Primary?    S/P total right hip arthroplasty      Physician: Earl Baires MD    Visit Date: 6/13/2023    Physician Orders: PT Eval and Treat   Medical Diagnosis from Referral: see above  Evaluation Date: 4/4/2023  Authorization Period Expiration: 6/2/2023   Plan of Care Expiration: 6/30/2023     Visit # / Visits authorized: 15/17     PTA Visit #: 2/5    Time In: 8:30 am  Time Out: 9:14 am  Total Billable Time: 44 minutes    Precautions: Standard and total hip  Functional Level Prior to Evaluation: independent     Subjective     Pt reports: says he has one more appt after today.  He was compliant with home exercise program.  Response to previous treatment: he was pretty sore   Functional change: no assistive device     Pain: 6/10  Location: right hip      Objective     Javad received therapeutic exercises to develop strength, endurance, ROM, and flexibility for 14 minutes including:  Bike x 5 minutes  Incline board stretch x 2 minutes  Bilateral leg press 8 plates with hip abduction with blue 3 x 10  Right leg press 4 plates with blue 3 x 10   Hamstring stretch 3 x 30 seconds   Stretch into hip abduction 10 x 10 second hold   Supine hip flexor stretch 3 x 30 second hold     Javad received the following manual therapy techniques: passive range of motion was applied to the: right hip for 0 minutes, including:  Manual hip flexion to 90 (w/ knee bent); hip adductor stretch; sidelying hip flexor stretch; prone quad stretch; supine hamstring stretch; hip external rotation stretch;   MFR/STM to glute med and iliotibial band (not performed today)     Javad participated in neuromuscular re-education activities to improve: Balance, Coordination, Proprioception, quad control and hip stability for 14 minutes. The following activities were included:  Hooklying hip adduction squeezes with  "bridge - 3 second hold   Straight leg raise 2 x 10  Single leg stance (right) - foam pad 3 x 30 seconds  Hooklying hip abduction (blue) with bridge 3 x 10  Prone hip extension w/ stabilization  RLE    Javad participated in dynamic functional therapeutic activities to improve functional performance for 16 minutes, including:  Squats in mirror x 20  Fwd step ups x 20 RLE 6inch step   Lateral step ups x 20 2"  TRX squats with cues to weight shift evenly to RLE   Cybex hip abduction 3 plates x 20 each leg  Cybex hip flexion 3 plates x 20 each leg  Cybex hip extension 3 plates x 20 each leg    Javad participated in gait training to improve functional mobility and safety for 0 minutes, including:  Ambulating a lap around gym with focus on keeping toes pointing forward and heel/toe pattern -   Increased step length on LLE      Home Exercises Provided and Patient Education Provided     Education provided: reviewed total hip precautions    Written Home Exercises Provided: Patient instructed to cont prior HEP.  Exercises were reviewed and Javad was able to demonstrate them prior to the end of the session.  Javad demonstrated good  understanding of the education provided.     See EMR under Patient Instructions for exercises provided prior visit.    Assessment   Evaluation assessment:  Javad is a 53 y.o. male referred to outpatient Physical Therapy with a medical diagnosis of s/p right total hip replacement. Pt presents with typical postop symptoms of decreased hip range of motion, gait disturbance, right lower extremity weakness, pain, and overall decrease in functional mobility. Patient was re-educated on total hip precautions. Patient is requiring assistance with all activiites of daily living except getting up and down from a chair and on and off the toilet.     Current assessment: Javad was able to add lateral stepup at 2" without significant effort. He performed hamstring stretch at stairs as well as stretching into hip " abduction. His gait shows improvement with more equal stance times but he still rests with his right knee slightly bent and his weight on the left. He did well with all exercises and reported feeling better after treatment.  Will continue to progress as tolerated staying within total hip precautions to work toward meeting all therapy goals to improve functional independence.    Javad Is progressing well towards his goals.   Pt prognosis is Good.     Pt will continue to benefit from skilled outpatient physical therapy to address the deficits listed in the problem list box on initial evaluation, provide pt/family education and to maximize pt's level of independence in the home and community environment.     Pt's spiritual, cultural and educational needs considered and pt agreeable to plan of care and goals.     Anticipated barriers to physical therapy: none    Goals:  Patient will be independent with home exercise program to facilitate carryover between visits. MET  Patient will be able to do a straight leg raise to allow him to get in and out of the bed independently. MET  Patient will be able to ambulate independently without assistive device for functional independence. MET   Patient will be able to go up and down 6 steps reciprocally without handrails to be able to get in and out of the front of his house independently. NOT MET - still uses rails and still lacks some eccentric control descending with right lower extremity  Patient will report decrease in right hip pain to </= 1/10 for improved quality of life. NOT MET - he still rates his pain 5/10 even though he says it isn't hurting much anymore  Patient will have >/= to 4+/5 strength in right hip/lower extremity for improved stability with gait and activiites of daily living. NOT MET/ONGOING - he has been able to increase weight and/or reps consistently  Patient will be able to step over edge of tub to allow him to shower independently.  MET  Patient will be  able to dress and drive independently. MET    Plan     Updated Certification Period:  6/5/2023 to 6/30/2023  Recommended Treatment Plan: 2 times per week for 4 weeks: Gait Training, Manual Therapy, Moist Heat/ Ice, Neuromuscular Re-ed, Patient Education, Therapeutic Activities, and Therapeutic Exercise  Other Recommendations: continue to focus on hip strength and range of motion       Kaylee Albert, PTA  6/13/2023

## 2023-06-15 ENCOUNTER — CLINICAL SUPPORT (OUTPATIENT)
Dept: REHABILITATION | Facility: HOSPITAL | Age: 53
End: 2023-06-15
Payer: MEDICAID

## 2023-06-15 DIAGNOSIS — R26.9 GAIT DISTURBANCE: Primary | ICD-10-CM

## 2023-06-15 DIAGNOSIS — Z96.641 S/P TOTAL RIGHT HIP ARTHROPLASTY: ICD-10-CM

## 2023-06-15 DIAGNOSIS — M25.661 DECREASED RANGE OF MOTION OF RIGHT LOWER EXTREMITY: ICD-10-CM

## 2023-06-15 PROCEDURE — 97530 THERAPEUTIC ACTIVITIES: CPT | Mod: CQ

## 2023-06-15 PROCEDURE — 97110 THERAPEUTIC EXERCISES: CPT | Mod: CQ

## 2023-06-15 NOTE — PROGRESS NOTES
Physical Therapy Treatment Note     Name: Javad Garcia  Clinic Number: [id number]    Therapy Diagnosis:        Encounter Diagnosis   Name Primary?    S/P total right hip arthroplasty      Physician: Earl Baires MD    Visit Date: 6/15/2023    Physician Orders: PT Eval and Treat   Medical Diagnosis from Referral: see above  Evaluation Date: 4/4/2023  Authorization Period Expiration: 6/2/2023   Plan of Care Expiration: 6/30/2023  Visit # / Visits authorized: 16/17   PTA Visit #: 3/5    Time In: 1511  Time Out: 1531  Total Billable Time: 30 minutes     Precautions: Standard and total hip  Functional Level Prior to Evaluation: independent     Received Plan of Care per Miriam Hunter PT     Subjective     Pt reports: no c/o except soreness with driving; no pain meds taken today  He was compliant with home exercise program.  Response to previous treatment: he was a little sore   Functional change: no assistive device     Ortho f/u: 6/22/2023    Pain: 0/10; 7/10 with driving which subsides with rest  Location: right hip      Objective     Javad received therapeutic exercises to develop strength, endurance, ROM, and flexibility for 10 minutes including:  Bike x 5 minutes  Incline board stretch x 2 minutes  Hamstring stretch 2 x 30 seconds     Javad received the following manual therapy techniques: passive range of motion was applied to the: right hip for 0 minutes, including:  Manual hip flexion to 90 (w/ knee bent); hip adductor stretch; sidelying hip flexor stretch; prone quad stretch; supine hamstring stretch; hip external rotation stretch;   MFR/STM to glute med and iliotibial band (not performed today)     Javad participated in neuromuscular re-education activities to improve: Balance, Coordination, Proprioception, quad control and hip stability for 0 minutes. The following activities were included:  Hooklying hip adduction squeezes with bridge - 3 second hold   Straight leg raise 2 x 10  Single leg stance (right) -  foam pad 3 x 30 seconds  Hooklying hip abduction (blue) with bridge 3 x 10  Prone hip extension w/ stabilization  RLE    Javad participated in dynamic functional therapeutic activities to improve functional performance for 13 minutes, including:  Up/down 4 steps, twice, no BUE support, w/ reciprocal pattern...No guarding/compensation   Demonstrates independence with walking between activities     Javad participated in gait training to improve functional mobility and safety for 0 minutes, including:  Ambulating a lap around gym with focus on keeping toes pointing forward and heel/toe pattern -   Increased step length on LLE      Home Exercises Provided and Patient Education Provided     Education provided: reviewed total hip precautions; continue current home exercise program     Written Home Exercises Provided: Patient instructed to cont prior HEP.  Exercises were reviewed and Javad was able to demonstrate them prior to the end of the session.  Javad demonstrated good  understanding of the education provided.     See EMR under Patient Instructions for exercises provided prior visit.    Assessment     Evaluation assessment:  Javad is a 53 y.o. male referred to outpatient Physical Therapy with a medical diagnosis of s/p right total hip replacement. Pt presents with typical postop symptoms of decreased hip range of motion, gait disturbance, right lower extremity weakness, pain, and overall decrease in functional mobility. Patient was re-educated on total hip precautions. Patient is requiring assistance with all activiites of daily living except getting up and down from a chair and on and off the toilet.     Current assessment: all goals met; pt requests d/c at this time    Pt prognosis is Good.      Anticipated barriers to physical therapy: none    Goals:  Patient will be independent with home exercise program to facilitate carryover between visits. MET  Patient will be able to do a straight leg raise to allow him to get in  and out of the bed independently. MET  Patient will be able to ambulate independently without assistive device for functional independence. MET   Patient will be able to go up and down 6 steps reciprocally without handrails to be able to get in and out of the front of his house independently. MET   Patient will report decrease in right hip pain to </= 1/10 for improved quality of life. MET   Patient will have >/= to 4+/5 strength in right hip/lower extremity for improved stability with gait and activiites of daily living. MET  Patient will be able to step over edge of tub to allow him to shower independently.  MET  Patient will be able to dress and drive independently. MET    Plan     Discharge per Plan of Care as goals are met; see d/c summary per PT     Shira Javier, PTA  6/15/2023

## 2023-07-24 PROBLEM — M25.551 ACUTE POSTOPERATIVE PAIN OF RIGHT HIP: Status: RESOLVED | Noted: 2023-04-18 | Resolved: 2023-07-24

## 2023-07-24 PROBLEM — G89.18 ACUTE POSTOPERATIVE PAIN OF RIGHT HIP: Status: RESOLVED | Noted: 2023-04-18 | Resolved: 2023-07-24

## 2025-02-17 ENCOUNTER — HOSPITAL ENCOUNTER (EMERGENCY)
Facility: HOSPITAL | Age: 55
Discharge: HOME | End: 2025-02-17
Attending: EMERGENCY MEDICINE
Payer: MEDICAID

## 2025-02-17 VITALS
RESPIRATION RATE: 18 BRPM | DIASTOLIC BLOOD PRESSURE: 69 MMHG | TEMPERATURE: 98 F | WEIGHT: 222 LBS | SYSTOLIC BLOOD PRESSURE: 133 MMHG | BODY MASS INDEX: 33.65 KG/M2 | OXYGEN SATURATION: 98 % | HEART RATE: 88 BPM | HEIGHT: 68 IN

## 2025-02-17 DIAGNOSIS — G44.89 HEADACHE SYNDROME: Primary | ICD-10-CM

## 2025-02-17 PROCEDURE — 63600175 PHARM REV CODE 636 W HCPCS: Mod: JZ,TB | Performed by: EMERGENCY MEDICINE

## 2025-02-17 PROCEDURE — 96372 THER/PROPH/DIAG INJ SC/IM: CPT | Performed by: EMERGENCY MEDICINE

## 2025-02-17 PROCEDURE — 99284 EMERGENCY DEPT VISIT MOD MDM: CPT | Mod: 25

## 2025-02-17 RX ORDER — KETOROLAC TROMETHAMINE 30 MG/ML
60 INJECTION, SOLUTION INTRAMUSCULAR; INTRAVENOUS
Status: COMPLETED | OUTPATIENT
Start: 2025-02-17 | End: 2025-02-17

## 2025-02-17 RX ADMIN — KETOROLAC TROMETHAMINE 60 MG: 30 INJECTION, SOLUTION INTRAMUSCULAR at 02:02

## 2025-02-17 NOTE — ED PROVIDER NOTES
Encounter Date: 2/17/2025    SCRIBE #1 NOTE: I, Cristela Gross, am scribing for, and in the presence of,  Ritchie Riley MD. I have scribed the entire note.       History     Chief Complaint   Patient presents with    Headache     Pov to er - c/o ha x 3 days - denies trauma - states doesn't feel like sinus ha - dizzy to stand      This is a 54 y/o male,who presents to the ED with complaints of HA which started 3 days ago. He reports he has a known hx of HTN and takes Lisinopril. He denies any CP, SOB, nausea, vomiting, or weakness. There are no other complaints/pain in the ED at this time.     The history is provided by the patient and the spouse. No  was used.     Review of patient's allergies indicates:   Allergen Reactions    Oxycodone-acetaminophen      Other reaction(s): Unknown     Past Medical History:   Diagnosis Date    Hypertension     Nontraumatic complete tear of left rotator cuff 03/09/2022    followed by Dr. Johnson     Past Surgical History:   Procedure Laterality Date    HERNIA REPAIR      JOINT REPLACEMENT Right     RTHR at [facility]  Dr. Baires    SHOULDER ARTHROSCOPY Left 03/22/2022    Procedure: ARTHROSCOPY, SHOULDER;  Surgeon: Nii Johnson MD;  Location: [facility];  Service: Orthopedics;  Laterality: Left;    WRIST SURGERY       No family history on file.  Social History[1]  Review of Systems   Respiratory:  Negative for shortness of breath.    Cardiovascular:  Negative for chest pain.   Gastrointestinal:  Negative for nausea and vomiting.   Neurological:  Positive for headaches. Negative for weakness.   All other systems reviewed and are negative.      Physical Exam     Initial Vitals [02/17/25 0042]   BP Pulse Resp Temp SpO2   134/70 90 18 97.9 °F (36.6 °C) 99 %      MAP       --         Physical Exam    Nursing note and vitals reviewed.  Constitutional: He appears well-developed and well-nourished.   HENT:   Head: Normocephalic and atraumatic.   Eyes:  EOM are normal. Pupils are equal, round, and reactive to light.   Neck: Neck supple.   Normal range of motion.  Cardiovascular:  Normal rate and regular rhythm.           No murmur heard.  Pulmonary/Chest: Breath sounds normal. No respiratory distress. He has no wheezes.   Abdominal: Abdomen is soft. Bowel sounds are normal. There is no abdominal tenderness.   Musculoskeletal:         General: No tenderness or edema. Normal range of motion.      Cervical back: Normal range of motion and neck supple.     Neurological: He is alert and oriented to person, place, and time. He has normal strength and normal reflexes. No cranial nerve deficit or sensory deficit. GCS score is 15. GCS eye subscore is 4. GCS verbal subscore is 5. GCS motor subscore is 6.   Skin: Skin is warm and dry. Capillary refill takes less than 2 seconds. No rash noted.   Psychiatric: He has a normal mood and affect.         ED Course   Procedures  Labs Reviewed - No data to display       Imaging Results              CT Head Without Contrast (In process)                      Medications   ketorolac injection 60 mg (60 mg Intramuscular Given 2/17/25 0228)     Medical Decision Making  A 55-year-old male patient came to the emergency department complaining of headache.  His physical examination is unremarkable including normal neurological examination.  The patient received 60 mg of Toradol intramuscularly to help with his symptoms and he is released.          Attending Attestation:           Physician Attestation for Scribe:  Physician Attestation Statement for Scribe #1: I, Ritchie Riley MD, reviewed documentation, as scribed by Cristela Gross in my presence, and it is both accurate and complete.                                    Clinical Impression:  Final diagnoses:  [G44.89] Headache syndrome (Primary)          ED Disposition Condition    Discharge Stable          ED Prescriptions    None       Follow-up Information    None              [1]    Social History  Tobacco Use    Smoking status: Former    Smokeless tobacco: Current     Types: Snuff   Substance Use Topics    Alcohol use: Not Currently    Drug use: Never        Ritchie Riley MD  02/17/25 [id number]

## (undated) DEVICE — FILM IOBAN ANTIMICROBL 35X35CM

## (undated) DEVICE — NEEDLE SCORPION PASSER (5/BX)

## (undated) DEVICE — CANNULA TWIST-IN 8.25MMX7CM

## (undated) DEVICE — APPLICATOR CHLORAPREP HI-LITE TINTED ORANGE 26ML

## (undated) DEVICE — IMP FIBERTAPE 2MM

## (undated) DEVICE — GLOVE SURGICAL PROTEXIS PI BLUE SIZE 8.0

## (undated) DEVICE — COVER LIGHT HANDLE FLEX PK/2

## (undated) DEVICE — BUR CUTTER BARREL 5.5MM FORMULA

## (undated) DEVICE — CANISTER SUCTION 12000ML DISPOSABLE

## (undated) DEVICE — PROBE SUCTION 90-S CRUISE 4.X135MM

## (undated) DEVICE — TUBING ARTHRO STRYKER

## (undated) DEVICE — WATER BOOM FLOOR SUCTION STRIP

## (undated) DEVICE — WRAP ARM STERILE DISPOSABLE

## (undated) DEVICE — Device

## (undated) DEVICE — SYRINGE 10-12CC LURE -LOK TIP

## (undated) DEVICE — SOL IRRIGATION SALINE 3000ML BAG

## (undated) DEVICE — CUTTER TOMCAT 4X125MM

## (undated) DEVICE — GLOVE SURGICAL PROTEXIS PI CLASSIC SIZE 7.5